# Patient Record
Sex: FEMALE | Race: WHITE | NOT HISPANIC OR LATINO | Employment: FULL TIME | URBAN - METROPOLITAN AREA
[De-identification: names, ages, dates, MRNs, and addresses within clinical notes are randomized per-mention and may not be internally consistent; named-entity substitution may affect disease eponyms.]

---

## 2017-06-23 ENCOUNTER — APPOINTMENT (OUTPATIENT)
Dept: WOMENS IMAGING | Facility: HOSPITAL | Age: 62
End: 2017-06-23

## 2017-06-23 PROCEDURE — 77067 SCR MAMMO BI INCL CAD: CPT | Performed by: RADIOLOGY

## 2017-06-23 PROCEDURE — 77063 BREAST TOMOSYNTHESIS BI: CPT | Performed by: RADIOLOGY

## 2017-06-23 PROCEDURE — G0202 SCR MAMMO BI INCL CAD: HCPCS | Performed by: RADIOLOGY

## 2017-08-14 DIAGNOSIS — Z00.00 HEALTH CARE MAINTENANCE: Primary | ICD-10-CM

## 2017-08-14 DIAGNOSIS — E03.9 ACQUIRED HYPOTHYROIDISM: ICD-10-CM

## 2017-08-16 LAB
25(OH)D3+25(OH)D2 SERPL-MCNC: 39.1 NG/ML (ref 30–100)
ALBUMIN SERPL-MCNC: 4.6 G/DL (ref 3.5–5.2)
ALBUMIN/GLOB SERPL: 1.7 G/DL
ALP SERPL-CCNC: 91 U/L (ref 39–117)
ALT SERPL-CCNC: 25 U/L (ref 1–33)
APPEARANCE UR: CLEAR
AST SERPL-CCNC: 27 U/L (ref 1–32)
BACTERIA #/AREA URNS HPF: NORMAL /HPF
BASOPHILS # BLD AUTO: 0.05 10*3/MM3 (ref 0–0.2)
BASOPHILS NFR BLD AUTO: 0.9 % (ref 0–1.5)
BILIRUB SERPL-MCNC: 0.5 MG/DL (ref 0.1–1.2)
BILIRUB UR QL STRIP: NEGATIVE
BUN SERPL-MCNC: 15 MG/DL (ref 8–23)
BUN/CREAT SERPL: 18.8 (ref 7–25)
CALCIUM SERPL-MCNC: 9.8 MG/DL (ref 8.6–10.5)
CHLORIDE SERPL-SCNC: 96 MMOL/L (ref 98–107)
CHOLEST SERPL-MCNC: 245 MG/DL (ref 0–200)
CO2 SERPL-SCNC: 26.1 MMOL/L (ref 22–29)
COLOR UR: YELLOW
CREAT SERPL-MCNC: 0.8 MG/DL (ref 0.57–1)
EOSINOPHIL # BLD AUTO: 0.61 10*3/MM3 (ref 0–0.7)
EOSINOPHIL NFR BLD AUTO: 11 % (ref 0.3–6.2)
EPI CELLS #/AREA URNS HPF: NORMAL /HPF
ERYTHROCYTE [DISTWIDTH] IN BLOOD BY AUTOMATED COUNT: 13.5 % (ref 11.7–13)
GLOBULIN SER CALC-MCNC: 2.7 GM/DL
GLUCOSE SERPL-MCNC: 95 MG/DL (ref 65–99)
GLUCOSE UR QL: NEGATIVE
HBA1C MFR BLD: 5.58 % (ref 4.8–5.6)
HCT VFR BLD AUTO: 43.4 % (ref 35.6–45.5)
HDLC SERPL-MCNC: 109 MG/DL (ref 40–60)
HGB BLD-MCNC: 13.8 G/DL (ref 11.9–15.5)
HGB UR QL STRIP: NEGATIVE
IMM GRANULOCYTES # BLD: 0 10*3/MM3 (ref 0–0.03)
IMM GRANULOCYTES NFR BLD: 0 % (ref 0–0.5)
KETONES UR QL STRIP: NEGATIVE
LDLC SERPL CALC-MCNC: 122 MG/DL (ref 0–100)
LEUKOCYTE ESTERASE UR QL STRIP: NEGATIVE
LYMPHOCYTES # BLD AUTO: 1.69 10*3/MM3 (ref 0.9–4.8)
LYMPHOCYTES NFR BLD AUTO: 30.6 % (ref 19.6–45.3)
MCH RBC QN AUTO: 31.3 PG (ref 26.9–32)
MCHC RBC AUTO-ENTMCNC: 31.8 G/DL (ref 32.4–36.3)
MCV RBC AUTO: 98.4 FL (ref 80.5–98.2)
MICRO URNS: ABNORMAL
MICRO URNS: ABNORMAL
MONOCYTES # BLD AUTO: 0.42 10*3/MM3 (ref 0.2–1.2)
MONOCYTES NFR BLD AUTO: 7.6 % (ref 5–12)
NEUTROPHILS # BLD AUTO: 2.76 10*3/MM3 (ref 1.9–8.1)
NEUTROPHILS NFR BLD AUTO: 49.9 % (ref 42.7–76)
NITRITE UR QL STRIP: NEGATIVE
PH UR STRIP: 7 [PH] (ref 5–7.5)
PLATELET # BLD AUTO: 241 10*3/MM3 (ref 140–500)
POTASSIUM SERPL-SCNC: 3.8 MMOL/L (ref 3.5–5.2)
PROT SERPL-MCNC: 7.3 G/DL (ref 6–8.5)
PROT UR QL STRIP: NEGATIVE
RBC # BLD AUTO: 4.41 10*6/MM3 (ref 3.9–5.2)
RBC #/AREA URNS HPF: NORMAL /HPF
SODIUM SERPL-SCNC: 136 MMOL/L (ref 136–145)
SP GR UR: <=1.005 (ref 1–1.03)
TRIGL SERPL-MCNC: 69 MG/DL (ref 0–150)
TSH SERPL DL<=0.005 MIU/L-ACNC: 1.72 MIU/ML (ref 0.27–4.2)
URINALYSIS REFLEX: ABNORMAL
UROBILINOGEN UR STRIP-MCNC: 0.2 MG/DL (ref 0.2–1)
VLDLC SERPL CALC-MCNC: 13.8 MG/DL (ref 5–40)
WBC # BLD AUTO: 5.53 10*3/MM3 (ref 4.5–10.7)
WBC #/AREA URNS HPF: NORMAL /HPF

## 2017-08-22 ENCOUNTER — OFFICE VISIT (OUTPATIENT)
Dept: INTERNAL MEDICINE | Facility: CLINIC | Age: 62
End: 2017-08-22

## 2017-08-22 VITALS
HEIGHT: 66 IN | RESPIRATION RATE: 18 BRPM | WEIGHT: 109 LBS | SYSTOLIC BLOOD PRESSURE: 132 MMHG | OXYGEN SATURATION: 97 % | BODY MASS INDEX: 17.52 KG/M2 | HEART RATE: 77 BPM | DIASTOLIC BLOOD PRESSURE: 68 MMHG

## 2017-08-22 DIAGNOSIS — Z00.00 HEALTH MAINTENANCE EXAMINATION: Primary | ICD-10-CM

## 2017-08-22 DIAGNOSIS — E03.9 ACQUIRED HYPOTHYROIDISM: ICD-10-CM

## 2017-08-22 PROCEDURE — 99396 PREV VISIT EST AGE 40-64: CPT | Performed by: INTERNAL MEDICINE

## 2017-08-22 RX ORDER — CLOBETASOL PROPIONATE 0.5 MG/G
OINTMENT TOPICAL
Refills: 3 | COMMUNITY
Start: 2017-06-14 | End: 2021-10-05

## 2017-08-22 NOTE — PROGRESS NOTES
Chief Complaint  Joanne Martin is a 62 y.o. female who presents for Annual Exam (CPE)  .    History of Present Illness   Joanne is here for routine CPE.  No new concerns.    Mammo:6/2017  Pap: 2013 Dr. Reza  C-scope:10/27/15 due 2020    Exercise: not much at all.     Immunization History   Administered Date(s) Administered   • Tdap 05/01/2010   • Zoster 11/13/2015       Review of Systems   Constitution: Negative for chills, fever and malaise/fatigue.   HENT: Positive for hearing loss (just starting). Negative for headaches, hoarse voice and sore throat.    Eyes: Negative for blurred vision, double vision and visual disturbance.   Cardiovascular: Negative for chest pain, leg swelling and palpitations.   Respiratory: Negative for cough and shortness of breath.    Endocrine: Negative for polydipsia and polyuria.   Hematologic/Lymphatic: Does not bruise/bleed easily.   Skin: Negative for rash and suspicious lesions.   Musculoskeletal: Negative for arthritis and myalgias.   Gastrointestinal: Negative for abdominal pain, change in bowel habit, constipation, diarrhea, dysphagia, hematochezia, melena, nausea and vomiting.   Genitourinary: Negative for dysuria and hematuria.   Neurological: Negative for dizziness and light-headedness.   Psychiatric/Behavioral: Negative for depression. The patient is not nervous/anxious.        Patient Active Problem List   Diagnosis   • Hypothyroidism       Past Medical History:   Diagnosis Date   • Carcinoma of tongue    • History of colonic polyps    • History of eczema    • History of fracture of clavicle    • Hypothyroidism        Past Surgical History:   Procedure Laterality Date   • BREAST SURGERY     • MOUTH SURGERY     • NASAL SEPTUM SURGERY      deviation repair       Family History   Problem Relation Age of Onset   • Colon cancer Father    • Macular degeneration Maternal Grandmother    • Thyroid disease Paternal Grandmother    • Coronary artery disease Other    • Melanoma Other   "      Social History     Social History   • Marital status: Unknown     Spouse name: N/A   • Number of children: N/A   • Years of education: N/A     Occupational History   •       Social History Main Topics   • Smoking status: Never Smoker   • Smokeless tobacco: Never Used   • Alcohol use No   • Drug use: No   • Sexual activity: Not on file     Other Topics Concern   • Not on file     Social History Narrative       Current Outpatient Prescriptions on File Prior to Visit   Medication Sig Dispense Refill   • SF 1.1 % gel U UTD.  0   • SYNTHROID 75 MCG tablet TAKE 1 TABLET BY MOUTH EVERY MORNING 90 tablet 3     No current facility-administered medications on file prior to visit.        Allergies   Allergen Reactions   • Cephalexin    • Hydrocortisone    • Latex    • Neomycin-Bacitracin Zn-Polymyx        Vitals:    08/22/17 1501   BP: 132/68   Pulse: 77   Resp: 18   SpO2: 97%   Weight: 109 lb (49.4 kg)   Height: 66\" (167.6 cm)       Body mass index is 17.59 kg/(m^2).    Objective   Physical Exam   Constitutional: She is oriented to person, place, and time. She appears well-developed and well-nourished. No distress.   HENT:   Head: Normocephalic and atraumatic.   Nose: Nose normal.   Mouth/Throat: Oropharynx is clear and moist.   Eyes: Conjunctivae and EOM are normal. Pupils are equal, round, and reactive to light. No scleral icterus.   Neck: Normal range of motion. Neck supple. No thyromegaly present.   Cardiovascular: Normal rate, regular rhythm and normal heart sounds.  Exam reveals no gallop and no friction rub.    No murmur heard.  Pulses:       Carotid pulses are 2+ on the right side, and 2+ on the left side.       Femoral pulses are 2+ on the right side, and 2+ on the left side.       Dorsalis pedis pulses are 2+ on the right side, and 2+ on the left side.        Posterior tibial pulses are 2+ on the right side, and 2+ on the left side.   Pulmonary/Chest: Effort normal and breath sounds normal. " No respiratory distress. She has no wheezes. She has no rales. Right breast exhibits no mass and no nipple discharge. Left breast exhibits no mass and no nipple discharge.   Abdominal: Soft. Bowel sounds are normal. She exhibits no distension and no mass. There is no tenderness.   Musculoskeletal: Normal range of motion. She exhibits no edema.       Vascular Status -  Her exam exhibits no right foot edema. Her exam exhibits no left foot edema.   Skin Integrity  -  Her right foot skin is intact.     Joanne 's left foot skin is intact. .  Lymphadenopathy:     She has no cervical adenopathy.     She has no axillary adenopathy.        Right: No inguinal and no supraclavicular adenopathy present.        Left: No inguinal and no supraclavicular adenopathy present.   Neurological: She is alert and oriented to person, place, and time. She has normal reflexes. No cranial nerve deficit.   Skin: Skin is warm and dry.   Psychiatric: She has a normal mood and affect. Her speech is normal and behavior is normal. Judgment and thought content normal. Cognition and memory are normal.   Vitals reviewed.        Results for orders placed or performed in visit on 08/14/17   Comprehensive Metabolic Panel   Result Value Ref Range    Glucose 95 65 - 99 mg/dL    BUN 15 8 - 23 mg/dL    Creatinine 0.80 0.57 - 1.00 mg/dL    eGFR Non African Am 73 >60 mL/min/1.73    eGFR African Am 89 >60 mL/min/1.73    BUN/Creatinine Ratio 18.8 7.0 - 25.0    Sodium 136 136 - 145 mmol/L    Potassium 3.8 3.5 - 5.2 mmol/L    Chloride 96 (L) 98 - 107 mmol/L    Total CO2 26.1 22.0 - 29.0 mmol/L    Calcium 9.8 8.6 - 10.5 mg/dL    Total Protein 7.3 6.0 - 8.5 g/dL    Albumin 4.60 3.50 - 5.20 g/dL    Globulin 2.7 gm/dL    A/G Ratio 1.7 g/dL    Total Bilirubin 0.5 0.1 - 1.2 mg/dL    Alkaline Phosphatase 91 39 - 117 U/L    AST (SGOT) 27 1 - 32 U/L    ALT (SGPT) 25 1 - 33 U/L   Lipid Panel   Result Value Ref Range    Total Cholesterol 245 (H) 0 - 200 mg/dL    Triglycerides  69 0 - 150 mg/dL    HDL Cholesterol 109 (H) 40 - 60 mg/dL    VLDL Cholesterol 13.8 5 - 40 mg/dL    LDL Cholesterol  122 (H) 0 - 100 mg/dL   TSH Rfx On Abnormal To Free T4   Result Value Ref Range    TSH 1.72 0.27 - 4.2 mIU/mL   UA / M With / Rflx Culture([facility] ONLY)   Result Value Ref Range    Specific Gravity, UA      <=1.005 (A) 1.005 - 1.030    pH, UA 7.0 5.0 - 7.5    Color, UA Yellow Yellow    Appearance, UA Clear Clear    Leukocytes, UA Negative Negative    Protein Negative Negative/Trace    Glucose, UA Negative Negative    Ketones Negative Negative    Blood, UA Negative Negative    Bilirubin, UA Negative Negative    Urobilinogen, UA 0.2 0.2 - 1.0 mg/dL    Nitrite, UA Negative Negative    Microscopic Examination Comment     MICROSCOPIC EXAMINATION See below:     Urinalysis Reflex Comment    Vitamin D 25 Hydroxy   Result Value Ref Range    25 Hydroxy, Vitamin D 39.1 30.0 - 100.0 ng/mL   Hemoglobin A1c   Result Value Ref Range    Hemoglobin A1C 5.58 4.80 - 5.60 %   Microscopic Examination   Result Value Ref Range    WBC, UA 0-5 0 - 5 /hpf    RBC, UA None seen 0 - 2 /hpf    Epithelial Cells (non renal) 0-10 0 - 10 /hpf    Bacteria, UA None seen None seen/Few /hpf   CBC & Differential   Result Value Ref Range    WBC 5.53 4.50 - 10.70 10*3/mm3    RBC 4.41 3.90 - 5.20 10*6/mm3    Hemoglobin 13.8 11.9 - 15.5 g/dL    Hematocrit 43.4 35.6 - 45.5 %    MCV 98.4 (H) 80.5 - 98.2 fL    MCH 31.3 26.9 - 32.0 pg    MCHC 31.8 (L) 32.4 - 36.3 g/dL    RDW 13.5 (H) 11.7 - 13.0 %    Platelets 241 140 - 500 10*3/mm3    Neutrophil Rel % 49.9 42.7 - 76.0 %    Lymphocyte Rel % 30.6 19.6 - 45.3 %    Monocyte Rel % 7.6 5.0 - 12.0 %    Eosinophil Rel % 11.0 (H) 0.3 - 6.2 %    Basophil Rel % 0.9 0.0 - 1.5 %    Neutrophils Absolute 2.76 1.90 - 8.10 10*3/mm3    Lymphocytes Absolute 1.69 0.90 - 4.80 10*3/mm3    Monocytes Absolute 0.42 0.20 - 1.20 10*3/mm3    Eosinophils Absolute 0.61 0.00 - 0.70 10*3/mm3    Basophils Absolute 0.05 0.00 -  0.20 10*3/mm3    Immature Granulocyte Rel % 0.0 0.0 - 0.5 %    Immature Grans Absolute 0.00 0.00 - 0.03 10*3/mm3       Assessment/Plan   Diagnoses and all orders for this visit:    Health maintenance examination    Acquired hypothyroidism    Other orders  -     clobetasol (TEMOVATE) 0.05 % ointment;   -     mupirocin (BACTROBAN) 2 % ointment; Apply  topically 3 (Three) Times a Day.        Discussion/Summary  Joanne is here for routine CPE.  She is up to date on all health maintenance except for paps.  She will get in with her gyn.  She has not been exercising nearly as much because her mother is now living with her.  She is really eager to get back to a more normal exercise routine.        Return in about 1 year (around 8/22/2018) for Annual physical, with fasting labs prior.

## 2017-12-11 RX ORDER — LEVOTHYROXINE SODIUM 75 MCG
TABLET ORAL
Qty: 90 TABLET | Refills: 3 | Status: SHIPPED | OUTPATIENT
Start: 2017-12-11 | End: 2018-10-22

## 2018-06-25 ENCOUNTER — APPOINTMENT (OUTPATIENT)
Dept: WOMENS IMAGING | Facility: HOSPITAL | Age: 63
End: 2018-06-25

## 2018-06-25 PROCEDURE — 77063 BREAST TOMOSYNTHESIS BI: CPT | Performed by: RADIOLOGY

## 2018-06-25 PROCEDURE — 77067 SCR MAMMO BI INCL CAD: CPT | Performed by: RADIOLOGY

## 2018-08-24 DIAGNOSIS — E03.9 ACQUIRED HYPOTHYROIDISM: ICD-10-CM

## 2018-08-24 DIAGNOSIS — Z00.00 HEALTH CARE MAINTENANCE: Primary | ICD-10-CM

## 2018-08-28 LAB
25(OH)D3+25(OH)D2 SERPL-MCNC: 42.2 NG/ML (ref 30–100)
ALBUMIN SERPL-MCNC: 4.2 G/DL (ref 3.5–5.2)
ALBUMIN/GLOB SERPL: 1.6 G/DL
ALP SERPL-CCNC: 75 U/L (ref 39–117)
ALT SERPL-CCNC: 28 U/L (ref 1–33)
APPEARANCE UR: CLEAR
AST SERPL-CCNC: 29 U/L (ref 1–32)
BACTERIA #/AREA URNS HPF: NORMAL /HPF
BASOPHILS # BLD AUTO: 0.04 10*3/MM3 (ref 0–0.2)
BASOPHILS NFR BLD AUTO: 0.7 % (ref 0–1.5)
BILIRUB SERPL-MCNC: 0.3 MG/DL (ref 0.1–1.2)
BILIRUB UR QL STRIP: NEGATIVE
BUN SERPL-MCNC: 19 MG/DL (ref 8–23)
BUN/CREAT SERPL: 23.2 (ref 7–25)
CALCIUM SERPL-MCNC: 9.6 MG/DL (ref 8.6–10.5)
CHLORIDE SERPL-SCNC: 100 MMOL/L (ref 98–107)
CHOLEST SERPL-MCNC: 232 MG/DL (ref 0–200)
CO2 SERPL-SCNC: 25.6 MMOL/L (ref 22–29)
COLOR UR: YELLOW
CREAT SERPL-MCNC: 0.82 MG/DL (ref 0.57–1)
EOSINOPHIL # BLD AUTO: 0.71 10*3/MM3 (ref 0–0.7)
EOSINOPHIL NFR BLD AUTO: 12.4 % (ref 0.3–6.2)
EPI CELLS #/AREA URNS HPF: NORMAL /HPF
ERYTHROCYTE [DISTWIDTH] IN BLOOD BY AUTOMATED COUNT: 13.6 % (ref 11.7–13)
GLOBULIN SER CALC-MCNC: 2.7 GM/DL
GLUCOSE SERPL-MCNC: 92 MG/DL (ref 65–99)
GLUCOSE UR QL: NEGATIVE
HBA1C MFR BLD: 5.5 % (ref 4.8–5.6)
HCT VFR BLD AUTO: 42.7 % (ref 35.6–45.5)
HDLC SERPL-MCNC: 105 MG/DL (ref 40–60)
HGB BLD-MCNC: 13.5 G/DL (ref 11.9–15.5)
HGB UR QL STRIP: ABNORMAL
IMM GRANULOCYTES # BLD: 0 10*3/MM3 (ref 0–0.03)
IMM GRANULOCYTES NFR BLD: 0 % (ref 0–0.5)
KETONES UR QL STRIP: NEGATIVE
LDLC SERPL CALC-MCNC: 115 MG/DL (ref 0–100)
LEUKOCYTE ESTERASE UR QL STRIP: NEGATIVE
LYMPHOCYTES # BLD AUTO: 1.81 10*3/MM3 (ref 0.9–4.8)
LYMPHOCYTES NFR BLD AUTO: 31.5 % (ref 19.6–45.3)
MCH RBC QN AUTO: 30.7 PG (ref 26.9–32)
MCHC RBC AUTO-ENTMCNC: 31.6 G/DL (ref 32.4–36.3)
MCV RBC AUTO: 97 FL (ref 80.5–98.2)
MICRO URNS: ABNORMAL
MONOCYTES # BLD AUTO: 0.53 10*3/MM3 (ref 0.2–1.2)
MONOCYTES NFR BLD AUTO: 9.2 % (ref 5–12)
MUCOUS THREADS URNS QL MICRO: PRESENT /HPF
NEUTROPHILS # BLD AUTO: 2.65 10*3/MM3 (ref 1.9–8.1)
NEUTROPHILS NFR BLD AUTO: 46.2 % (ref 42.7–76)
NITRITE UR QL STRIP: NEGATIVE
PH UR STRIP: 6.5 [PH] (ref 5–7.5)
PLATELET # BLD AUTO: 227 10*3/MM3 (ref 140–500)
POTASSIUM SERPL-SCNC: 4.2 MMOL/L (ref 3.5–5.2)
PROT SERPL-MCNC: 6.9 G/DL (ref 6–8.5)
PROT UR QL STRIP: NEGATIVE
RBC # BLD AUTO: 4.4 10*6/MM3 (ref 3.9–5.2)
RBC #/AREA URNS HPF: NORMAL /HPF
SODIUM SERPL-SCNC: 139 MMOL/L (ref 136–145)
SP GR UR: 1.02 (ref 1–1.03)
T4 FREE SERPL-MCNC: 1.18 NG/DL (ref 0.93–1.7)
TRIGL SERPL-MCNC: 62 MG/DL (ref 0–150)
TSH SERPL DL<=0.005 MIU/L-ACNC: 5.53 MIU/ML (ref 0.27–4.2)
URINALYSIS REFLEX: ABNORMAL
UROBILINOGEN UR STRIP-MCNC: 0.2 MG/DL (ref 0.2–1)
VLDLC SERPL CALC-MCNC: 12.4 MG/DL (ref 5–40)
WBC # BLD AUTO: 5.74 10*3/MM3 (ref 4.5–10.7)
WBC #/AREA URNS HPF: NORMAL /HPF

## 2018-09-04 ENCOUNTER — OFFICE VISIT (OUTPATIENT)
Dept: INTERNAL MEDICINE | Facility: CLINIC | Age: 63
End: 2018-09-04

## 2018-09-04 VITALS
OXYGEN SATURATION: 99 % | DIASTOLIC BLOOD PRESSURE: 68 MMHG | WEIGHT: 112 LBS | BODY MASS INDEX: 18 KG/M2 | RESPIRATION RATE: 18 BRPM | SYSTOLIC BLOOD PRESSURE: 118 MMHG | HEIGHT: 66 IN | HEART RATE: 72 BPM

## 2018-09-04 DIAGNOSIS — Z00.00 HEALTH MAINTENANCE EXAMINATION: Primary | ICD-10-CM

## 2018-09-04 DIAGNOSIS — M81.0 OSTEOPOROSIS OF LUMBAR SPINE: ICD-10-CM

## 2018-09-04 DIAGNOSIS — E03.9 ACQUIRED HYPOTHYROIDISM: ICD-10-CM

## 2018-09-04 DIAGNOSIS — M85.859 OSTEOPENIA OF HIP: ICD-10-CM

## 2018-09-04 DIAGNOSIS — Z78.0 POSTMENOPAUSAL: ICD-10-CM

## 2018-09-04 PROBLEM — M85.88 OSTEOPENIA OF LUMBAR SPINE: Status: ACTIVE | Noted: 2018-09-04

## 2018-09-04 PROBLEM — L30.9 ECZEMA: Status: ACTIVE | Noted: 2018-09-04

## 2018-09-04 PROCEDURE — 90471 IMMUNIZATION ADMIN: CPT | Performed by: INTERNAL MEDICINE

## 2018-09-04 PROCEDURE — 77080 DXA BONE DENSITY AXIAL: CPT | Performed by: INTERNAL MEDICINE

## 2018-09-04 PROCEDURE — 90632 HEPA VACCINE ADULT IM: CPT | Performed by: INTERNAL MEDICINE

## 2018-09-04 PROCEDURE — 99396 PREV VISIT EST AGE 40-64: CPT | Performed by: INTERNAL MEDICINE

## 2018-09-04 RX ORDER — CHLORPHENIRAMINE MALEATE 4 MG/1
TABLET ORAL
COMMUNITY
End: 2018-09-04

## 2018-09-04 NOTE — PROGRESS NOTES
Chief Complaint  Joanne Martin is a 63 y.o. female who presents for Annual Exam (CPE)  .    History of Present Illness   Joanne is here for routine CPE.  No new concerns.    Mammo: 6/25/2018  Pap: 5/30/2018,   DEXA: 8/18/2016, due this year.   C-scope: 10/27/2015, repeat in five years.     Exercise: Walks a lot, not much exercise other than that.     Immunization History   Administered Date(s) Administered   • Hepatitis A 09/04/2018   • Tdap 05/01/2010   • Zostavax 11/13/2015       Review of Systems   Constitution: Negative for chills, fever and malaise/fatigue.   HENT: Negative for hearing loss, hoarse voice and sore throat.    Eyes: Negative for blurred vision, double vision and visual disturbance.   Cardiovascular: Negative for chest pain, leg swelling and palpitations.   Respiratory: Negative for cough and shortness of breath.    Endocrine: Negative for polydipsia and polyuria.   Hematologic/Lymphatic: Does not bruise/bleed easily.   Skin: Positive for rash (eczema). Negative for suspicious lesions.   Musculoskeletal: Negative for arthritis and myalgias.   Gastrointestinal: Negative for bloating, abdominal pain, change in bowel habit, constipation, diarrhea, dysphagia, hematochezia, melena, nausea and vomiting.   Genitourinary: Negative for dysuria and hematuria.   Neurological: Negative for dizziness, headaches and light-headedness.   Psychiatric/Behavioral: Negative for depression. The patient is not nervous/anxious.        Patient Active Problem List   Diagnosis   • Hypothyroidism   • Eczema   • Osteopenia of lumbar spine   • Osteopenia of hip       Past Medical History:   Diagnosis Date   • Carcinoma of tongue (CMS/HCC)    • History of colonic polyps    • History of eczema    • History of fracture of clavicle    • Hypothyroidism        Past Surgical History:   Procedure Laterality Date   • BREAST SURGERY     • MOUTH SURGERY     • NASAL SEPTUM SURGERY      deviation repair       Family History  "  Problem Relation Age of Onset   • Colon cancer Father    • Macular degeneration Maternal Grandmother    • Thyroid disease Paternal Grandmother    • Coronary artery disease Other    • Melanoma Other        Social History     Social History   • Marital status: Unknown     Spouse name: N/A   • Number of children: N/A   • Years of education: N/A     Occupational History   •       Social History Main Topics   • Smoking status: Never Smoker   • Smokeless tobacco: Never Used   • Alcohol use No   • Drug use: No   • Sexual activity: Not on file     Other Topics Concern   • Not on file     Social History Narrative   • No narrative on file       Current Outpatient Prescriptions on File Prior to Visit   Medication Sig Dispense Refill   • clobetasol (TEMOVATE) 0.05 % ointment   3   • mupirocin (BACTROBAN) 2 % ointment Apply  topically 3 (Three) Times a Day. 22 g 2   • SF 1.1 % gel U UTD.  0   • SYNTHROID 75 MCG tablet TAKE 1 TABLET BY MOUTH EVERY MORNING 90 tablet 3     No current facility-administered medications on file prior to visit.        Allergies   Allergen Reactions   • Cephalexin    • Hydrocortisone    • Latex    • Neomycin-Bacitracin Zn-Polymyx        Vitals:    09/04/18 0814   BP: 118/68   Pulse: 72   Resp: 18   SpO2: 99%   Weight: 50.8 kg (112 lb)   Height: 167.6 cm (65.98\")       Body mass index is 18.09 kg/m².    Objective   Physical Exam   Constitutional: She is oriented to person, place, and time. She appears well-developed and well-nourished. No distress.   HENT:   Head: Normocephalic and atraumatic.   Nose: Nose normal.   Mouth/Throat: Oropharynx is clear and moist.   Eyes: Pupils are equal, round, and reactive to light. Conjunctivae and EOM are normal. No scleral icterus.   Neck: Normal range of motion. Neck supple. No thyromegaly present.   Cardiovascular: Normal rate, regular rhythm and normal heart sounds.  Exam reveals no gallop and no friction rub.    No murmur heard.  Pulses:       " Carotid pulses are 2+ on the right side, and 2+ on the left side.       Femoral pulses are 2+ on the right side, and 2+ on the left side.       Dorsalis pedis pulses are 2+ on the right side, and 2+ on the left side.        Posterior tibial pulses are 2+ on the right side, and 2+ on the left side.   Pulmonary/Chest: Effort normal and breath sounds normal. No respiratory distress. She has no wheezes. She has no rales. Right breast exhibits no mass and no nipple discharge. Left breast exhibits no mass and no nipple discharge.   Abdominal: Soft. Bowel sounds are normal. She exhibits no distension and no mass. There is no tenderness.   Musculoskeletal: Normal range of motion. She exhibits no edema.     Vascular Status -  Her right foot exhibits no edema. Her left foot exhibits no edema.  Skin Integrity  -  Her right foot skin is intact.Her left foot skin is intact..  Lymphadenopathy:     She has no cervical adenopathy.     She has no axillary adenopathy.        Right: No inguinal and no supraclavicular adenopathy present.        Left: No inguinal and no supraclavicular adenopathy present.   Neurological: She is alert and oriented to person, place, and time. She has normal reflexes. No cranial nerve deficit.   Skin: Skin is warm and dry.   Psychiatric: She has a normal mood and affect. Her speech is normal and behavior is normal. Judgment and thought content normal. Cognition and memory are normal.   Vitals reviewed.        Results for orders placed or performed in visit on 08/24/18   Comprehensive Metabolic Panel   Result Value Ref Range    Glucose 92 65 - 99 mg/dL    BUN 19 8 - 23 mg/dL    Creatinine 0.82 0.57 - 1.00 mg/dL    eGFR Non African Am 71 >60 mL/min/1.73    eGFR African Am 86 >60 mL/min/1.73    BUN/Creatinine Ratio 23.2 7.0 - 25.0    Sodium 139 136 - 145 mmol/L    Potassium 4.2 3.5 - 5.2 mmol/L    Chloride 100 98 - 107 mmol/L    Total CO2 25.6 22.0 - 29.0 mmol/L    Calcium 9.6 8.6 - 10.5 mg/dL    Total  Protein 6.9 6.0 - 8.5 g/dL    Albumin 4.20 3.50 - 5.20 g/dL    Globulin 2.7 gm/dL    A/G Ratio 1.6 g/dL    Total Bilirubin 0.3 0.1 - 1.2 mg/dL    Alkaline Phosphatase 75 39 - 117 U/L    AST (SGOT) 29 1 - 32 U/L    ALT (SGPT) 28 1 - 33 U/L   Lipid Panel   Result Value Ref Range    Total Cholesterol 232 (H) 0 - 200 mg/dL    Triglycerides 62 0 - 150 mg/dL    HDL Cholesterol 105 (H) 40 - 60 mg/dL    VLDL Cholesterol 12.4 5 - 40 mg/dL    LDL Cholesterol  115 (H) 0 - 100 mg/dL   TSH Rfx On Abnormal To Free T4   Result Value Ref Range    TSH 5.53 (H) 0.27 - 4.2 mIU/mL   Vitamin D 25 Hydroxy   Result Value Ref Range    25 Hydroxy, Vitamin D 42.2 30.0 - 100.0 ng/mL   Hemoglobin A1c   Result Value Ref Range    Hemoglobin A1C 5.50 4.80 - 5.60 %   Urinalysis With Culture If Indicated - Urine, Clean Catch   Result Value Ref Range    Specific Gravity, UA 1.017 1.005 - 1.030    pH, UA 6.5 5.0 - 7.5    Color, UA Yellow Yellow    Appearance, UA Clear Clear    Leukocytes, UA Negative Negative    Protein Negative Negative/Trace    Glucose, UA Negative Negative    Ketones Negative Negative    Blood, UA Trace (A) Negative    Bilirubin, UA Negative Negative    Urobilinogen, UA 0.2 0.2 - 1.0 mg/dL    Nitrite, UA Negative Negative    Microscopic Examination See below:     Urinalysis Reflex Comment    Microscopic Examination   Result Value Ref Range    WBC, UA 0-5 0 - 5 /hpf    RBC, UA 0-2 0 - 2 /hpf    Epithelial Cells (non renal) 0-10 0 - 10 /hpf    Mucus, UA Present Not Estab. /hpf    Bacteria, UA None seen None seen/Few /hpf   T4F   Result Value Ref Range    Free T4 1.18 0.93 - 1.70 ng/dL   CBC & Differential   Result Value Ref Range    WBC 5.74 4.50 - 10.70 10*3/mm3    RBC 4.40 3.90 - 5.20 10*6/mm3    Hemoglobin 13.5 11.9 - 15.5 g/dL    Hematocrit 42.7 35.6 - 45.5 %    MCV 97.0 80.5 - 98.2 fL    MCH 30.7 26.9 - 32.0 pg    MCHC 31.6 (L) 32.4 - 36.3 g/dL    RDW 13.6 (H) 11.7 - 13.0 %    Platelets 227 140 - 500 10*3/mm3    Neutrophil Rel  % 46.2 42.7 - 76.0 %    Lymphocyte Rel % 31.5 19.6 - 45.3 %    Monocyte Rel % 9.2 5.0 - 12.0 %    Eosinophil Rel % 12.4 (H) 0.3 - 6.2 %    Basophil Rel % 0.7 0.0 - 1.5 %    Neutrophils Absolute 2.65 1.90 - 8.10 10*3/mm3    Lymphocytes Absolute 1.81 0.90 - 4.80 10*3/mm3    Monocytes Absolute 0.53 0.20 - 1.20 10*3/mm3    Eosinophils Absolute 0.71 (H) 0.00 - 0.70 10*3/mm3    Basophils Absolute 0.04 0.00 - 0.20 10*3/mm3    Immature Granulocyte Rel % 0.0 0.0 - 0.5 %    Immature Grans Absolute 0.00 0.00 - 0.03 10*3/mm3       Assessment/Plan   Diagnoses and all orders for this visit:    Health maintenance examination  -     Hepatitis A Vaccine Adult IM    Acquired hypothyroidism  -     TSH; Future    Osteoporosis of lumbar spine    Osteopenia of hip    Postmenopausal  -     DEXA Bone Density Axial    Other orders  -     Discontinue: chlorpheniramine (CHLOR-TRIMETON) 4 MG tablet; Take  by mouth.        Discussion/Summary  Joanne is here for routine CPE.  She is up to date on almost all health maintenance.  She will look into the new shingles vaccine at her local pharmacy.  Her thyroid is off slightly.  She does miss doses occasionally.  We are going to recheck her TSH in 6 weeks.  Her bone density has worsened slightly.  Given her hx of XRT for her tongue cancer, she is not a candidate for bisphosphonate therapy.  She is going to make sure she is getting 1200 mg of calcium daily. Weight bearing exercise.  Repeat a DEXA in 2 years.        Return in about 6 weeks (around 10/16/2018) for labs only nonfasting; one year CPE with labs. .

## 2018-10-16 ENCOUNTER — RESULTS ENCOUNTER (OUTPATIENT)
Dept: INTERNAL MEDICINE | Facility: CLINIC | Age: 63
End: 2018-10-16

## 2018-10-16 DIAGNOSIS — E03.9 ACQUIRED HYPOTHYROIDISM: ICD-10-CM

## 2018-10-18 ENCOUNTER — TELEPHONE (OUTPATIENT)
Dept: INTERNAL MEDICINE | Facility: CLINIC | Age: 63
End: 2018-10-18

## 2018-10-18 DIAGNOSIS — E03.9 ACQUIRED HYPOTHYROIDISM: Primary | ICD-10-CM

## 2018-10-18 LAB — TSH SERPL DL<=0.005 MIU/L-ACNC: 4.34 MIU/ML (ref 0.27–4.2)

## 2018-10-18 NOTE — TELEPHONE ENCOUNTER
----- Message from Tracie Robbins MD sent at 10/18/2018  4:30 PM EDT -----  Please call - her thyroid is still off slightly.  I think we should increase her levothyroxine from 75 to 88 and recheck in 6 weeks.

## 2018-10-19 RX ORDER — LEVOTHYROXINE SODIUM 88 UG/1
88 TABLET ORAL DAILY
Qty: 30 TABLET | Refills: 3 | Status: SHIPPED | OUTPATIENT
Start: 2018-10-19 | End: 2019-02-12 | Stop reason: SDUPTHER

## 2018-10-19 NOTE — TELEPHONE ENCOUNTER
I sent the 88mcg in today so the patient could start taking the increased dosage.   Pt aware to schedule a lab apt 6 weeks from starting the new dose so we can recheck tsh.

## 2018-11-27 DIAGNOSIS — E03.9 ACQUIRED HYPOTHYROIDISM: ICD-10-CM

## 2018-11-28 LAB — TSH SERPL DL<=0.005 MIU/L-ACNC: 0.47 MIU/ML (ref 0.27–4.2)

## 2018-11-29 ENCOUNTER — TELEPHONE (OUTPATIENT)
Dept: INTERNAL MEDICINE | Facility: CLINIC | Age: 63
End: 2018-11-29

## 2018-11-29 NOTE — TELEPHONE ENCOUNTER
----- Message from Tracie Robbins MD sent at 11/29/2018  8:10 AM EST -----  Please call - her thyroid is well controlled.

## 2019-02-12 RX ORDER — LEVOTHYROXINE SODIUM 88 MCG
TABLET ORAL
Qty: 30 TABLET | Refills: 3 | Status: SHIPPED | OUTPATIENT
Start: 2019-02-12 | End: 2019-05-06

## 2019-05-03 ENCOUNTER — LAB (OUTPATIENT)
Dept: INTERNAL MEDICINE | Facility: CLINIC | Age: 64
End: 2019-05-03

## 2019-05-03 DIAGNOSIS — E03.9 HYPOTHYROIDISM, UNSPECIFIED TYPE: Primary | ICD-10-CM

## 2019-05-03 LAB
T4 FREE SERPL-MCNC: 1.47 NG/DL (ref 0.93–1.7)
TSH SERPL DL<=0.005 MIU/L-ACNC: 0.19 MIU/ML (ref 0.27–4.2)

## 2019-05-03 PROCEDURE — 90471 IMMUNIZATION ADMIN: CPT | Performed by: INTERNAL MEDICINE

## 2019-05-03 PROCEDURE — 90632 HEPA VACCINE ADULT IM: CPT | Performed by: INTERNAL MEDICINE

## 2019-05-06 RX ORDER — LEVOTHYROXINE SODIUM 75 MCG
75 TABLET ORAL DAILY
Qty: 30 TABLET | Refills: 4 | Status: SHIPPED | OUTPATIENT
Start: 2019-05-06 | End: 2019-09-24 | Stop reason: SDUPTHER

## 2019-05-28 DIAGNOSIS — E03.9 ACQUIRED HYPOTHYROIDISM: Primary | ICD-10-CM

## 2019-06-17 DIAGNOSIS — E03.9 HYPOTHYROIDISM, UNSPECIFIED TYPE: Primary | ICD-10-CM

## 2019-06-19 LAB — TSH SERPL DL<=0.005 MIU/L-ACNC: 0.98 MIU/ML (ref 0.27–4.2)

## 2019-06-26 ENCOUNTER — APPOINTMENT (OUTPATIENT)
Dept: WOMENS IMAGING | Facility: HOSPITAL | Age: 64
End: 2019-06-26

## 2019-06-26 PROCEDURE — 77063 BREAST TOMOSYNTHESIS BI: CPT | Performed by: RADIOLOGY

## 2019-06-26 PROCEDURE — 77067 SCR MAMMO BI INCL CAD: CPT | Performed by: RADIOLOGY

## 2019-09-17 DIAGNOSIS — Z00.00 HEALTH CARE MAINTENANCE: Primary | ICD-10-CM

## 2019-09-18 LAB
ALBUMIN SERPL-MCNC: 4.6 G/DL (ref 3.5–5.2)
ALBUMIN/GLOB SERPL: 1.8 G/DL
ALP SERPL-CCNC: 92 U/L (ref 39–117)
ALT SERPL-CCNC: 26 U/L (ref 1–33)
APPEARANCE UR: CLEAR
AST SERPL-CCNC: 27 U/L (ref 1–32)
BACTERIA #/AREA URNS HPF: NORMAL /HPF
BASOPHILS # BLD AUTO: 0.07 10*3/MM3 (ref 0–0.2)
BASOPHILS NFR BLD AUTO: 1.3 % (ref 0–1.5)
BILIRUB SERPL-MCNC: 0.5 MG/DL (ref 0.2–1.2)
BILIRUB UR QL STRIP: NEGATIVE
BUN SERPL-MCNC: 16 MG/DL (ref 8–23)
BUN/CREAT SERPL: 20.3 (ref 7–25)
CALCIUM SERPL-MCNC: 9.6 MG/DL (ref 8.6–10.5)
CHLORIDE SERPL-SCNC: 97 MMOL/L (ref 98–107)
CHOLEST SERPL-MCNC: 255 MG/DL (ref 0–200)
CO2 SERPL-SCNC: 26.2 MMOL/L (ref 22–29)
COLOR UR: YELLOW
CREAT SERPL-MCNC: 0.79 MG/DL (ref 0.57–1)
EOSINOPHIL # BLD AUTO: 0.53 10*3/MM3 (ref 0–0.4)
EOSINOPHIL NFR BLD AUTO: 10 % (ref 0.3–6.2)
EPI CELLS #/AREA URNS HPF: NORMAL /HPF
ERYTHROCYTE [DISTWIDTH] IN BLOOD BY AUTOMATED COUNT: 13.2 % (ref 12.3–15.4)
GLOBULIN SER CALC-MCNC: 2.5 GM/DL
GLUCOSE SERPL-MCNC: 95 MG/DL (ref 65–99)
GLUCOSE UR QL: NEGATIVE
HCT VFR BLD AUTO: 45.6 % (ref 34–46.6)
HDLC SERPL-MCNC: 114 MG/DL (ref 40–60)
HGB BLD-MCNC: 14.8 G/DL (ref 12–15.9)
HGB UR QL STRIP: NEGATIVE
IMM GRANULOCYTES # BLD AUTO: 0.01 10*3/MM3 (ref 0–0.05)
IMM GRANULOCYTES NFR BLD AUTO: 0.2 % (ref 0–0.5)
KETONES UR QL STRIP: NEGATIVE
LDLC SERPL CALC-MCNC: 129 MG/DL (ref 0–100)
LEUKOCYTE ESTERASE UR QL STRIP: NEGATIVE
LYMPHOCYTES # BLD AUTO: 1.37 10*3/MM3 (ref 0.7–3.1)
LYMPHOCYTES NFR BLD AUTO: 25.8 % (ref 19.6–45.3)
MCH RBC QN AUTO: 31 PG (ref 26.6–33)
MCHC RBC AUTO-ENTMCNC: 32.5 G/DL (ref 31.5–35.7)
MCV RBC AUTO: 95.6 FL (ref 79–97)
MICRO URNS: ABNORMAL
MICRO URNS: ABNORMAL
MONOCYTES # BLD AUTO: 0.4 10*3/MM3 (ref 0.1–0.9)
MONOCYTES NFR BLD AUTO: 7.5 % (ref 5–12)
NEUTROPHILS # BLD AUTO: 2.94 10*3/MM3 (ref 1.7–7)
NEUTROPHILS NFR BLD AUTO: 55.2 % (ref 42.7–76)
NITRITE UR QL STRIP: NEGATIVE
NRBC BLD AUTO-RTO: 0 /100 WBC (ref 0–0.2)
PH UR STRIP: 6.5 [PH] (ref 5–7.5)
PLATELET # BLD AUTO: 230 10*3/MM3 (ref 140–450)
POTASSIUM SERPL-SCNC: 4 MMOL/L (ref 3.5–5.2)
PROT SERPL-MCNC: 7.1 G/DL (ref 6–8.5)
PROT UR QL STRIP: NEGATIVE
RBC # BLD AUTO: 4.77 10*6/MM3 (ref 3.77–5.28)
RBC #/AREA URNS HPF: NORMAL /HPF
SODIUM SERPL-SCNC: 138 MMOL/L (ref 136–145)
SP GR UR: <=1.005 (ref 1–1.03)
TRIGL SERPL-MCNC: 60 MG/DL (ref 0–150)
TSH SERPL DL<=0.005 MIU/L-ACNC: 1.49 UIU/ML (ref 0.27–4.2)
URINALYSIS REFLEX: ABNORMAL
UROBILINOGEN UR STRIP-MCNC: 0.2 MG/DL (ref 0.2–1)
VLDLC SERPL CALC-MCNC: 12 MG/DL
WBC # BLD AUTO: 5.32 10*3/MM3 (ref 3.4–10.8)
WBC #/AREA URNS HPF: NORMAL /HPF

## 2019-09-24 ENCOUNTER — OFFICE VISIT (OUTPATIENT)
Dept: INTERNAL MEDICINE | Facility: CLINIC | Age: 64
End: 2019-09-24

## 2019-09-24 VITALS
HEART RATE: 82 BPM | DIASTOLIC BLOOD PRESSURE: 82 MMHG | BODY MASS INDEX: 18.48 KG/M2 | HEIGHT: 66 IN | SYSTOLIC BLOOD PRESSURE: 132 MMHG | OXYGEN SATURATION: 93 % | WEIGHT: 115 LBS

## 2019-09-24 DIAGNOSIS — Z00.00 WELL ADULT EXAM: Primary | ICD-10-CM

## 2019-09-24 PROBLEM — M85.859 OSTEOPENIA OF HIP: Status: RESOLVED | Noted: 2018-09-04 | Resolved: 2019-09-24

## 2019-09-24 PROBLEM — Z85.810 HISTORY OF TONGUE CANCER: Status: ACTIVE | Noted: 2019-09-24

## 2019-09-24 PROBLEM — M85.89 OSTEOPENIA OF MULTIPLE SITES: Status: ACTIVE | Noted: 2018-09-04

## 2019-09-24 PROBLEM — M81.0 OSTEOPOROSIS: Status: ACTIVE | Noted: 2018-09-04

## 2019-09-24 PROBLEM — E78.5 HYPERLIPIDEMIA: Status: ACTIVE | Noted: 2019-09-24

## 2019-09-24 PROCEDURE — 99396 PREV VISIT EST AGE 40-64: CPT | Performed by: INTERNAL MEDICINE

## 2019-09-24 RX ORDER — LEVOTHYROXINE SODIUM 75 MCG
75 TABLET ORAL DAILY
Qty: 90 TABLET | Refills: 3 | Status: SHIPPED | OUTPATIENT
Start: 2019-09-24 | End: 2020-09-30 | Stop reason: SDUPTHER

## 2019-09-24 NOTE — PROGRESS NOTES
Subjective     Joanne Martin is a 64 y.o. female who presents for a complete physical exam.      History of Present Illness     HLD.  Moderate elevation in cholesterol.  We discussed diet.  Hypothyroidism.  Good control.   OP.  Reviewed last BMD.  She has a history of head/neck radiation.     Review of Systems   Constitutional: Negative.    HENT: Negative.    Eyes: Negative.    Respiratory: Negative.    Cardiovascular: Negative.    Gastrointestinal: Negative.    Endocrine: Negative.    Genitourinary: Negative.    Musculoskeletal: Negative.    Skin: Negative.    Allergic/Immunologic: Negative.    Neurological: Negative.    Hematological: Negative.    Psychiatric/Behavioral: Negative.        The following portions of the patient's history were reviewed and updated as appropriate: allergies, current medications, past family history, past medical history, past social history, past surgical history and problem list.  Health maintenance tab was reviewed and updated with the patient.       Patient Active Problem List    Diagnosis Date Noted   • Hyperlipidemia 09/24/2019   • History of tongue cancer 09/24/2019     Note Last Updated: 9/24/2019     Initial in 1996.  Surgery.  Reoccurrence in 2008.  Radiation at that time.       • Eczema 09/04/2018   • Osteoporosis 09/04/2018   • Hypothyroidism 08/18/2016       Past Medical History:   Diagnosis Date   • Carcinoma of tongue (CMS/HCC)    • History of colonic polyps    • History of eczema    • History of fracture of clavicle    • Hypothyroidism        Past Surgical History:   Procedure Laterality Date   • BREAST SURGERY     • MOUTH SURGERY     • NASAL SEPTUM SURGERY      deviation repair       Family History   Problem Relation Age of Onset   • Colon cancer Father    • Macular degeneration Maternal Grandmother    • Thyroid disease Paternal Grandmother    • Coronary artery disease Other    • Macular degeneration Mother    • Melanoma Sister        Social History     Socioeconomic  "History   • Marital status: Unknown     Spouse name: Not on file   • Number of children: Not on file   • Years of education: Not on file   • Highest education level: Not on file   Occupational History   • Occupation:    Tobacco Use   • Smoking status: Never Smoker   • Smokeless tobacco: Never Used   Substance and Sexual Activity   • Alcohol use: No   • Drug use: No       Current Outpatient Medications on File Prior to Visit   Medication Sig Dispense Refill   • clobetasol (TEMOVATE) 0.05 % ointment   3   • mupirocin (BACTROBAN) 2 % ointment Apply  topically 3 (Three) Times a Day. 22 g 2   • SF 1.1 % gel U UTD.  0   • [DISCONTINUED] SYNTHROID 75 MCG tablet Take 1 tablet by mouth Daily. 30 tablet 4     No current facility-administered medications on file prior to visit.        Allergies   Allergen Reactions   • Cephalexin    • Hydrocortisone    • Latex    • Neomycin-Bacitracin Zn-Polymyx        Immunization History   Administered Date(s) Administered   • Hepatitis A 09/04/2018, 05/03/2019   • Tdap 05/01/2010   • Zostavax 11/13/2015       Objective     /82   Pulse 82   Ht 167.6 cm (65.98\")   Wt 52.2 kg (115 lb)   SpO2 93%   BMI 18.57 kg/m²     Physical Exam   Constitutional: She is oriented to person, place, and time. She appears well-developed and well-nourished.   HENT:   Head: Normocephalic and atraumatic.   Right Ear: Hearing, tympanic membrane and external ear normal.   Left Ear: Hearing, tympanic membrane and external ear normal.   Nose: Nose normal.   Mouth/Throat: Oropharynx is clear and moist.   Neck: Neck supple. No thyromegaly present.   Cardiovascular: Normal rate, regular rhythm and normal heart sounds.   No murmur heard.  Pulmonary/Chest: Effort normal and breath sounds normal. Right breast exhibits no mass. Left breast exhibits no mass.   Abdominal: Soft. She exhibits no distension. There is no hepatosplenomegaly. There is no tenderness.   Genitourinary: No breast tenderness. "   Lymphadenopathy:     She has no cervical adenopathy.   Neurological: She is alert and oriented to person, place, and time.   Skin: Skin is warm and dry.   Psychiatric: She has a normal mood and affect. Her speech is normal and behavior is normal. Judgment and thought content normal. Cognition and memory are normal.       Assessment/Plan   Joanne was seen today for annual exam.    Diagnoses and all orders for this visit:    Well adult exam    Other orders  -     SYNTHROID 75 MCG tablet; Take 1 tablet by mouth Daily.        Discussion    Patient presents today for a CPE.      Patient follows a healthy diet.   Patient follows an adequate exercise regimen. Mammogram is up to date.   Colon cancer screening is up to date.   DEXA is up to date.    HLD.  We discussed diet.  I recommend a diet high in fruits, vegetables, whole grains, lean meats, nuts and beans.  I recommend limiting red meat, full fat dairy, eggs and processed white carbohydrates.  I recommend aerobic exercise at least 3 days per week. I also recommend to watch salt intake.    Hypothyroidism.  Continue Synthroid.  OP.  I recommend to get 1200 mg of calcium and 1000 IUs of vitamin D through diet and supplements and to participate in a weight based exercise to prevent loss of bone mineral density. Bone mineral will be monitored every two years.  Defer currently on Rx given history of head and neck radiation.    I have recommended that the patient get the following immunizations:  Shingrix and flu.      Health Maintenance   Topic Date Due   • ZOSTER VACCINE (2 of 3) 01/08/2016   • INFLUENZA VACCINE  08/01/2019   • TDAP/TD VACCINES (2 - Td) 05/01/2020   • MAMMOGRAM  06/01/2020   • DXA SCAN  09/05/2020   • LIPID PANEL  09/17/2020   • ANNUAL PHYSICAL  09/25/2020   • COLONOSCOPY  10/27/2020   • PAP SMEAR  05/30/2021   • HEPATITIS C SCREENING  Completed            Future Appointments   Date Time Provider Department Center   9/23/2020  8:50 AM [facility] None   9/23/2020  9:00 AM [facility] None   9/30/2020  8:15 AM Saniya Walker MD [facility] None

## 2019-09-24 NOTE — PATIENT INSTRUCTIONS
"DASH Eating Plan  DASH stands for \"Dietary Approaches to Stop Hypertension.\" The DASH eating plan is a healthy eating plan that has been shown to reduce high blood pressure (hypertension). It may also reduce your risk for type 2 diabetes, heart disease, and stroke. The DASH eating plan may also help with weight loss.  What are tips for following this plan?    General guidelines  · Avoid eating more than 2,300 mg (milligrams) of salt (sodium) a day. If you have hypertension, you may need to reduce your sodium intake to 1,500 mg a day.  · Limit alcohol intake to no more than 1 drink a day for nonpregnant women and 2 drinks a day for men. One drink equals 12 oz of beer, 5 oz of wine, or 1½ oz of hard liquor.  · Work with your health care provider to maintain a healthy body weight or to lose weight. Ask what an ideal weight is for you.  · Get at least 30 minutes of exercise that causes your heart to beat faster (aerobic exercise) most days of the week. Activities may include walking, swimming, or biking.  · Work with your health care provider or diet and nutrition specialist (dietitian) to adjust your eating plan to your individual calorie needs.  Reading food labels    · Check food labels for the amount of sodium per serving. Choose foods with less than 5 percent of the Daily Value of sodium. Generally, foods with less than 300 mg of sodium per serving fit into this eating plan.  · To find whole grains, look for the word \"whole\" as the first word in the ingredient list.  Shopping  · Buy products labeled as \"low-sodium\" or \"no salt added.\"  · Buy fresh foods. Avoid canned foods and premade or frozen meals.  Cooking  · Avoid adding salt when cooking. Use salt-free seasonings or herbs instead of table salt or sea salt. Check with your health care provider or pharmacist before using salt substitutes.  · Do not bailon foods. Cook foods using healthy methods such as baking, boiling, grilling, and broiling instead.  · Cook with " heart-healthy oils, such as olive, canola, soybean, or sunflower oil.  Meal planning  · Eat a balanced diet that includes:  ? 5 or more servings of fruits and vegetables each day. At each meal, try to fill half of your plate with fruits and vegetables.  ? Up to 6-8 servings of whole grains each day.  ? Less than 6 oz of lean meat, poultry, or fish each day. A 3-oz serving of meat is about the same size as a deck of cards. One egg equals 1 oz.  ? 2 servings of low-fat dairy each day.  ? A serving of nuts, seeds, or beans 5 times each week.  ? Heart-healthy fats. Healthy fats called Omega-3 fatty acids are found in foods such as flaxseeds and coldwater fish, like sardines, salmon, and mackerel.  · Limit how much you eat of the following:  ? Canned or prepackaged foods.  ? Food that is high in trans fat, such as fried foods.  ? Food that is high in saturated fat, such as fatty meat.  ? Sweets, desserts, sugary drinks, and other foods with added sugar.  ? Full-fat dairy products.  · Do not salt foods before eating.  · Try to eat at least 2 vegetarian meals each week.  · Eat more home-cooked food and less restaurant, buffet, and fast food.  · When eating at a restaurant, ask that your food be prepared with less salt or no salt, if possible.  What foods are recommended?  The items listed may not be a complete list. Talk with your dietitian about what dietary choices are best for you.  Grains  Whole-grain or whole-wheat bread. Whole-grain or whole-wheat pasta. Brown rice. Oatmeal. Quinoa. Bulgur. Whole-grain and low-sodium cereals. Ellen bread. Low-fat, low-sodium crackers. Whole-wheat flour tortillas.  Vegetables  Fresh or frozen vegetables (raw, steamed, roasted, or grilled). Low-sodium or reduced-sodium tomato and vegetable juice. Low-sodium or reduced-sodium tomato sauce and tomato paste. Low-sodium or reduced-sodium canned vegetables.  Fruits  All fresh, dried, or frozen fruit. Canned fruit in natural juice (without  added sugar).  Meat and other protein foods  Skinless chicken or turkey. Ground chicken or turkey. Pork with fat trimmed off. Fish and seafood. Egg whites. Dried beans, peas, or lentils. Unsalted nuts, nut butters, and seeds. Unsalted canned beans. Lean cuts of beef with fat trimmed off. Low-sodium, lean deli meat.  Dairy  Low-fat (1%) or fat-free (skim) milk. Fat-free, low-fat, or reduced-fat cheeses. Nonfat, low-sodium ricotta or cottage cheese. Low-fat or nonfat yogurt. Low-fat, low-sodium cheese.  Fats and oils  Soft margarine without trans fats. Vegetable oil. Low-fat, reduced-fat, or light mayonnaise and salad dressings (reduced-sodium). Canola, safflower, olive, soybean, and sunflower oils. Avocado.  Seasoning and other foods  Herbs. Spices. Seasoning mixes without salt. Unsalted popcorn and pretzels. Fat-free sweets.  What foods are not recommended?  The items listed may not be a complete list. Talk with your dietitian about what dietary choices are best for you.  Grains  Baked goods made with fat, such as croissants, muffins, or some breads. Dry pasta or rice meal packs.  Vegetables  Creamed or fried vegetables. Vegetables in a cheese sauce. Regular canned vegetables (not low-sodium or reduced-sodium). Regular canned tomato sauce and paste (not low-sodium or reduced-sodium). Regular tomato and vegetable juice (not low-sodium or reduced-sodium). Pickles. Olives.  Fruits  Canned fruit in a light or heavy syrup. Fried fruit. Fruit in cream or butter sauce.  Meat and other protein foods  Fatty cuts of meat. Ribs. Fried meat. [place]. Sausage. Bologna and other processed lunch meats. Salami. Fatback. Hotdogs. Bratwurst. Salted nuts and seeds. Canned beans with added salt. Canned or smoked fish. Whole eggs or egg yolks. Chicken or turkey with skin.  Dairy  Whole or 2% milk, cream, and half-and-half. Whole or full-fat cream cheese. Whole-fat or sweetened yogurt. Full-fat cheese. Nondairy creamers. Whipped toppings.  Processed cheese and cheese spreads.  Fats and oils  Butter. Stick margarine. Lard. Shortening. Ghee. [place] fat. Tropical oils, such as coconut, palm kernel, or palm oil.  Seasoning and other foods  Salted popcorn and pretzels. Onion salt, garlic salt, seasoned salt, table salt, and sea salt. Worcestershire sauce. Tartar sauce. Barbecue sauce. Teriyaki sauce. Soy sauce, including reduced-sodium. Steak sauce. Canned and packaged gravies. Fish sauce. Oyster sauce. Cocktail sauce. Horseradish that you find on the shelf. Ketchup. Mustard. Meat flavorings and tenderizers. Bouillon cubes. Hot sauce and Tabasco sauce. Premade or packaged marinades. Premade or packaged taco seasonings. Relishes. Regular salad dressings.  Where to find more information:  · National Heart, Lung, and Blood [place]: www.nhlbi.nih.gov  · American Heart Association: www.heart.org  Summary  · The DASH eating plan is a healthy eating plan that has been shown to reduce high blood pressure (hypertension). It may also reduce your risk for type 2 diabetes, heart disease, and stroke.  · With the DASH eating plan, you should limit salt (sodium) intake to 2,300 mg a day. If you have hypertension, you may need to reduce your sodium intake to 1,500 mg a day.  · When on the DASH eating plan, aim to eat more fresh fruits and vegetables, whole grains, lean proteins, low-fat dairy, and heart-healthy fats.  · Work with your health care provider or diet and nutrition specialist (dietitian) to adjust your eating plan to your individual calorie needs.  This information is not intended to replace advice given to you by your health care provider. Make sure you discuss any questions you have with your health care provider.  Document Released: 12/06/2012 Document Revised: 12/11/2017 Document Reviewed: 12/11/2017  [employer] Interactive Patient Education © 2019 [employer]

## 2020-06-30 ENCOUNTER — APPOINTMENT (OUTPATIENT)
Dept: WOMENS IMAGING | Facility: HOSPITAL | Age: 65
End: 2020-06-30

## 2020-06-30 PROCEDURE — 77063 BREAST TOMOSYNTHESIS BI: CPT | Performed by: RADIOLOGY

## 2020-06-30 PROCEDURE — 77067 SCR MAMMO BI INCL CAD: CPT | Performed by: RADIOLOGY

## 2020-09-23 DIAGNOSIS — M81.0 OSTEOPOROSIS, UNSPECIFIED OSTEOPOROSIS TYPE, UNSPECIFIED PATHOLOGICAL FRACTURE PRESENCE: ICD-10-CM

## 2020-09-23 DIAGNOSIS — Z00.00 HEALTH CARE MAINTENANCE: Primary | ICD-10-CM

## 2020-09-23 LAB
25(OH)D3+25(OH)D2 SERPL-MCNC: 45.9 NG/ML (ref 30–100)
ALBUMIN SERPL-MCNC: 4.8 G/DL (ref 3.5–5.2)
ALBUMIN/GLOB SERPL: 2.2 G/DL
ALP SERPL-CCNC: 102 U/L (ref 39–117)
ALT SERPL-CCNC: 22 U/L (ref 1–33)
APPEARANCE UR: CLEAR
AST SERPL-CCNC: 23 U/L (ref 1–32)
BACTERIA #/AREA URNS HPF: NORMAL /HPF
BASOPHILS # BLD AUTO: 0.06 10*3/MM3 (ref 0–0.2)
BASOPHILS NFR BLD AUTO: 1.2 % (ref 0–1.5)
BILIRUB SERPL-MCNC: 0.5 MG/DL (ref 0–1.2)
BILIRUB UR QL STRIP: NEGATIVE
BUN SERPL-MCNC: 19 MG/DL (ref 8–23)
BUN/CREAT SERPL: 22.1 (ref 7–25)
CALCIUM SERPL-MCNC: 9.8 MG/DL (ref 8.6–10.5)
CASTS URNS MICRO: NORMAL
CHLORIDE SERPL-SCNC: 102 MMOL/L (ref 98–107)
CHOLEST SERPL-MCNC: 253 MG/DL (ref 0–200)
CO2 SERPL-SCNC: 26.4 MMOL/L (ref 22–29)
COLOR UR: YELLOW
CREAT SERPL-MCNC: 0.86 MG/DL (ref 0.57–1)
EOSINOPHIL # BLD AUTO: 0.48 10*3/MM3 (ref 0–0.4)
EOSINOPHIL NFR BLD AUTO: 9.2 % (ref 0.3–6.2)
EPI CELLS #/AREA URNS HPF: NORMAL /HPF
ERYTHROCYTE [DISTWIDTH] IN BLOOD BY AUTOMATED COUNT: 12.9 % (ref 12.3–15.4)
GLOBULIN SER CALC-MCNC: 2.2 GM/DL
GLUCOSE SERPL-MCNC: 102 MG/DL (ref 65–99)
GLUCOSE UR QL: NEGATIVE
HCT VFR BLD AUTO: 44.2 % (ref 34–46.6)
HDLC SERPL-MCNC: 110 MG/DL (ref 40–60)
HGB BLD-MCNC: 14.8 G/DL (ref 12–15.9)
HGB UR QL STRIP: ABNORMAL
IMM GRANULOCYTES # BLD AUTO: 0.01 10*3/MM3 (ref 0–0.05)
IMM GRANULOCYTES NFR BLD AUTO: 0.2 % (ref 0–0.5)
KETONES UR QL STRIP: NEGATIVE
LDLC SERPL CALC-MCNC: 131 MG/DL (ref 0–100)
LEUKOCYTE ESTERASE UR QL STRIP: ABNORMAL
LYMPHOCYTES # BLD AUTO: 1.62 10*3/MM3 (ref 0.7–3.1)
LYMPHOCYTES NFR BLD AUTO: 31.1 % (ref 19.6–45.3)
MCH RBC QN AUTO: 31 PG (ref 26.6–33)
MCHC RBC AUTO-ENTMCNC: 33.5 G/DL (ref 31.5–35.7)
MCV RBC AUTO: 92.7 FL (ref 79–97)
MONOCYTES # BLD AUTO: 0.41 10*3/MM3 (ref 0.1–0.9)
MONOCYTES NFR BLD AUTO: 7.9 % (ref 5–12)
NEUTROPHILS # BLD AUTO: 2.63 10*3/MM3 (ref 1.7–7)
NEUTROPHILS NFR BLD AUTO: 50.4 % (ref 42.7–76)
NITRITE UR QL STRIP: NEGATIVE
NRBC BLD AUTO-RTO: 0 /100 WBC (ref 0–0.2)
PH UR STRIP: 6 [PH] (ref 5–8)
PLATELET # BLD AUTO: 257 10*3/MM3 (ref 140–450)
POTASSIUM SERPL-SCNC: 4.5 MMOL/L (ref 3.5–5.2)
PROT SERPL-MCNC: 7 G/DL (ref 6–8.5)
PROT UR QL STRIP: NEGATIVE
RBC # BLD AUTO: 4.77 10*6/MM3 (ref 3.77–5.28)
RBC #/AREA URNS HPF: NORMAL /HPF
SODIUM SERPL-SCNC: 141 MMOL/L (ref 136–145)
SP GR UR: 1.02 (ref 1–1.03)
TRIGL SERPL-MCNC: 60 MG/DL (ref 0–150)
TSH SERPL DL<=0.005 MIU/L-ACNC: 2.91 UIU/ML (ref 0.27–4.2)
UROBILINOGEN UR STRIP-MCNC: ABNORMAL MG/DL
VLDLC SERPL CALC-MCNC: 12 MG/DL
WBC # BLD AUTO: 5.21 10*3/MM3 (ref 3.4–10.8)
WBC #/AREA URNS HPF: NORMAL /HPF

## 2020-09-30 ENCOUNTER — OFFICE VISIT (OUTPATIENT)
Dept: INTERNAL MEDICINE | Facility: CLINIC | Age: 65
End: 2020-09-30

## 2020-09-30 VITALS
HEART RATE: 62 BPM | DIASTOLIC BLOOD PRESSURE: 80 MMHG | WEIGHT: 114 LBS | HEIGHT: 66 IN | SYSTOLIC BLOOD PRESSURE: 130 MMHG | OXYGEN SATURATION: 98 % | BODY MASS INDEX: 18.32 KG/M2

## 2020-09-30 DIAGNOSIS — Z00.00 WELL ADULT EXAM: Primary | ICD-10-CM

## 2020-09-30 DIAGNOSIS — E78.5 HYPERLIPIDEMIA, UNSPECIFIED HYPERLIPIDEMIA TYPE: ICD-10-CM

## 2020-09-30 DIAGNOSIS — E03.9 ACQUIRED HYPOTHYROIDISM: ICD-10-CM

## 2020-09-30 DIAGNOSIS — M81.0 OSTEOPOROSIS, UNSPECIFIED OSTEOPOROSIS TYPE, UNSPECIFIED PATHOLOGICAL FRACTURE PRESENCE: ICD-10-CM

## 2020-09-30 PROBLEM — Z92.3 HISTORY OF HEAD AND NECK RADIATION: Status: ACTIVE | Noted: 2020-09-30

## 2020-09-30 PROCEDURE — 99396 PREV VISIT EST AGE 40-64: CPT | Performed by: INTERNAL MEDICINE

## 2020-09-30 RX ORDER — LEVOTHYROXINE SODIUM 0.07 MG/1
75 TABLET ORAL DAILY
Qty: 90 TABLET | Refills: 3 | Status: SHIPPED | OUTPATIENT
Start: 2020-09-30 | End: 2021-07-27 | Stop reason: SDUPTHER

## 2020-09-30 NOTE — PROGRESS NOTES
Subjective     Joanne Martin is a 65 y.o. female who presents for a complete physical exam.      History of Present Illness     OP.  We discussed calcium and D.  She is due for DEXA.  With her history of head/neck radiation I wouldn't feel comfortable with bisphosphonates or Prolia.  I would consider Forteo.    HLD.  Ratios are good.  Discussed diet.  Hypothyroidism.  Good control.      Review of Systems   Constitutional: Negative.    HENT: Negative.    Eyes: Negative.    Respiratory: Negative.    Cardiovascular: Negative.    Gastrointestinal: Negative.    Endocrine: Negative.    Genitourinary: Negative.    Musculoskeletal: Negative.    Skin: Negative.    Allergic/Immunologic: Negative.    Neurological: Negative.    Hematological: Negative.    Psychiatric/Behavioral: Negative.        The following portions of the patient's history were reviewed and updated as appropriate: allergies, current medications, past family history, past medical history, past social history, past surgical history and problem list.  Health maintenance tab was reviewed and updated with the patient.       Patient Active Problem List    Diagnosis Date Noted   • History of head and neck radiation 09/30/2020   • Hyperlipidemia 09/24/2019     Note Last Updated: 9/30/2020     3.9% 10 year risk on 9/30/2020     • History of tongue cancer 09/24/2019     Note Last Updated: 9/24/2019     Initial in 1996.  Surgery.  Reoccurrence in 2008.  Radiation at that time.       • Eczema 09/04/2018   • Osteoporosis 09/04/2018   • Hypothyroidism 08/18/2016       Past Medical History:   Diagnosis Date   • Carcinoma of tongue (CMS/HCC)    • History of colonic polyps    • History of eczema    • History of fracture of clavicle    • Hypothyroidism        Past Surgical History:   Procedure Laterality Date   • BREAST SURGERY     • MOUTH SURGERY     • NASAL SEPTUM SURGERY      deviation repair       Family History   Problem Relation Age of Onset   • Colon cancer Father    •  "Macular degeneration Maternal Grandmother    • Thyroid disease Paternal Grandmother    • Coronary artery disease Other    • Macular degeneration Mother    • Melanoma Sister        Social History     Socioeconomic History   • Marital status: Unknown     Spouse name: Not on file   • Number of children: Not on file   • Years of education: Not on file   • Highest education level: Not on file   Occupational History   • Occupation:    Tobacco Use   • Smoking status: Never Smoker   • Smokeless tobacco: Never Used   Substance and Sexual Activity   • Alcohol use: No   • Drug use: No       Current Outpatient Medications on File Prior to Visit   Medication Sig Dispense Refill   • clobetasol (TEMOVATE) 0.05 % ointment   3   • mupirocin (BACTROBAN) 2 % ointment Apply  topically 3 (Three) Times a Day. 22 g 2   • SF 1.1 % gel U UTD.  0   • [DISCONTINUED] SYNTHROID 75 MCG tablet Take 1 tablet by mouth Daily. 90 tablet 3     No current facility-administered medications on file prior to visit.        Allergies   Allergen Reactions   • Cephalexin    • Hydrocortisone    • Latex    • Neomycin-Bacitracin Zn-Polymyx        Immunization History   Administered Date(s) Administered   • Flulaval/Fluarix Quad 10/30/2019   • Hepatitis A 09/04/2018, 05/03/2019   • Tdap 05/01/2010   • Zostavax 11/13/2015       Objective     /80   Pulse 62   Ht 167.6 cm (65.98\")   Wt 51.7 kg (114 lb)   SpO2 98%   BMI 18.41 kg/m²     Physical Exam  Constitutional:       Appearance: She is well-developed.   HENT:      Head: Normocephalic and atraumatic.      Right Ear: Hearing, tympanic membrane and external ear normal.      Left Ear: Hearing, tympanic membrane and external ear normal.      Nose: Nose normal.   Neck:      Musculoskeletal: Neck supple.      Thyroid: No thyromegaly.   Cardiovascular:      Rate and Rhythm: Normal rate and regular rhythm.      Heart sounds: Normal heart sounds. No murmur.   Pulmonary:      Effort: Pulmonary " effort is normal.      Breath sounds: Normal breath sounds.   Chest:      Breasts:         Right: No mass.         Left: No mass.   Abdominal:      General: There is no distension.      Palpations: Abdomen is soft.      Tenderness: There is no abdominal tenderness.   Lymphadenopathy:      Cervical: No cervical adenopathy.   Skin:     General: Skin is warm and dry.   Neurological:      Mental Status: She is alert and oriented to person, place, and time.   Psychiatric:         Speech: Speech normal.         Behavior: Behavior normal.         Thought Content: Thought content normal.         Judgment: Judgment normal.         Assessment/Plan   Joanne was seen today for annual exam.    Diagnoses and all orders for this visit:    Well adult exam    Osteoporosis, unspecified osteoporosis type, unspecified pathological fracture presence  -     Cancel: DEXA Bone Density Axial  -     DEXA Bone Density Axial    Hyperlipidemia, unspecified hyperlipidemia type    Acquired hypothyroidism    Other orders  -     levothyroxine (Synthroid) 75 MCG tablet; Take 1 tablet by mouth Daily.        Discussion    Patient presents today for a CPE.      Patient follows a healthy diet.   Patient follows an adequate exercise regimen. Mammogram is up to date.   Colon cancer screening is up to date.   Pap smears are performed by the patient's gynecologist.   Patient will schedule a DEXA.  I have recommended that the patient get the following immunizations:  Shingrix, tdap and flu.  HLD.  We discussed diet.  I recommend a diet high in fruits, vegetables, whole grains, lean meats, nuts and beans.  I recommend limiting red meat, full fat dairy, eggs and processed white carbohydrates.  I recommend aerobic exercise at least 3 days per week.   OP.  I recommend to get 1200 mg of calcium and 1000 IUs of vitamin D through diet and supplements and to participate in a weight based exercise to prevent loss of bone mineral density. Bone mineral will be monitored  every two years.   Consider Forteo.    Hypothyroidism.  The patient will continue current regimen.            Health Maintenance   Topic Date Due   • ZOSTER VACCINE (2 of 3) 01/08/2016   • TDAP/TD VACCINES (2 - Td) 05/01/2020   • INFLUENZA VACCINE  08/01/2020   • DXA SCAN  09/05/2020   • COLONOSCOPY  10/27/2020   • PAP SMEAR  05/30/2021   • MAMMOGRAM  06/29/2021   • LIPID PANEL  09/23/2021   • ANNUAL PHYSICAL  10/01/2021   • HEPATITIS C SCREENING  Completed   • Pneumococcal Vaccine 0-64  Aged Out            Future Appointments   Date Time Provider Department Center   9/29/2021  9:00 AM [facility] None   10/5/2021  8:15 AM Saniya Walker [facility] None

## 2020-12-17 ENCOUNTER — APPOINTMENT (OUTPATIENT)
Dept: WOMENS IMAGING | Facility: HOSPITAL | Age: 65
End: 2020-12-17

## 2020-12-17 PROCEDURE — 77080 DXA BONE DENSITY AXIAL: CPT | Performed by: RADIOLOGY

## 2021-03-16 ENCOUNTER — BULK ORDERING (OUTPATIENT)
Dept: CASE MANAGEMENT | Facility: OTHER | Age: 66
End: 2021-03-16

## 2021-03-16 DIAGNOSIS — Z23 IMMUNIZATION DUE: ICD-10-CM

## 2021-07-01 ENCOUNTER — APPOINTMENT (OUTPATIENT)
Dept: WOMENS IMAGING | Facility: HOSPITAL | Age: 66
End: 2021-07-01

## 2021-07-01 PROCEDURE — 77063 BREAST TOMOSYNTHESIS BI: CPT | Performed by: RADIOLOGY

## 2021-07-01 PROCEDURE — 77067 SCR MAMMO BI INCL CAD: CPT | Performed by: RADIOLOGY

## 2021-07-27 ENCOUNTER — TELEPHONE (OUTPATIENT)
Dept: INTERNAL MEDICINE | Facility: CLINIC | Age: 66
End: 2021-07-27

## 2021-07-27 RX ORDER — LEVOTHYROXINE SODIUM 0.07 MG/1
75 TABLET ORAL DAILY
Qty: 90 TABLET | Refills: 3 | Status: SHIPPED | OUTPATIENT
Start: 2021-07-27 | End: 2021-10-05 | Stop reason: SDUPTHER

## 2021-07-27 NOTE — TELEPHONE ENCOUNTER
Caller: Joanne Martin    Relationship: Self    Best call back number: [phone]    Medication needed:   Requested Prescriptions     Pending Prescriptions Disp Refills   • levothyroxine (Synthroid) 75 MCG tablet 90 tablet 3     Sig: Take 1 tablet by mouth Daily.       When do you need the refill by: 7/30    What additional details did the patient provide when requesting the medication: PATIENT HAS ENOUGH TO GET HER THROUGH THE REST OF THIS WEEK, BUT HAS NO REFILLS AVAILABLE TO LAST UNTIL HER APPOINTMENT IN October.  PATIENT IS REQUESTING 3 REFILLS JUST UNTIL THE APPOINTMENT.    Does the patient have less than a 3 day supply:  [] Yes  [x] No    What is the patient's preferred pharmacy: [id number] [address] [facility] [phone] [facility] [phone] FX

## 2021-08-17 ENCOUNTER — PREP FOR SURGERY (OUTPATIENT)
Dept: OTHER | Facility: HOSPITAL | Age: 66
End: 2021-08-17

## 2021-08-17 DIAGNOSIS — Z86.010 HISTORY OF ADENOMATOUS POLYP OF COLON: Primary | ICD-10-CM

## 2021-08-31 PROBLEM — Z86.0101 HISTORY OF ADENOMATOUS POLYP OF COLON: Status: ACTIVE | Noted: 2021-08-31

## 2021-08-31 PROBLEM — Z86.010 HISTORY OF ADENOMATOUS POLYP OF COLON: Status: ACTIVE | Noted: 2021-08-31

## 2021-09-24 DIAGNOSIS — E03.9 ACQUIRED HYPOTHYROIDISM: ICD-10-CM

## 2021-09-24 DIAGNOSIS — M81.0 OSTEOPOROSIS, UNSPECIFIED OSTEOPOROSIS TYPE, UNSPECIFIED PATHOLOGICAL FRACTURE PRESENCE: ICD-10-CM

## 2021-09-24 DIAGNOSIS — E78.5 HYPERLIPIDEMIA, UNSPECIFIED HYPERLIPIDEMIA TYPE: Primary | ICD-10-CM

## 2021-09-29 LAB
25(OH)D3+25(OH)D2 SERPL-MCNC: 45.4 NG/ML (ref 30–100)
ALBUMIN SERPL-MCNC: 4.4 G/DL (ref 3.5–5.2)
ALBUMIN/GLOB SERPL: 2.2 G/DL
ALP SERPL-CCNC: 88 U/L (ref 39–117)
ALT SERPL-CCNC: 18 U/L (ref 1–33)
APPEARANCE UR: CLEAR
AST SERPL-CCNC: 23 U/L (ref 1–32)
BACTERIA #/AREA URNS HPF: NORMAL /HPF
BASOPHILS # BLD AUTO: 0.07 10*3/MM3 (ref 0–0.2)
BASOPHILS NFR BLD AUTO: 1.3 % (ref 0–1.5)
BILIRUB SERPL-MCNC: 0.5 MG/DL (ref 0–1.2)
BILIRUB UR QL STRIP: NEGATIVE
BUN SERPL-MCNC: 14 MG/DL (ref 8–23)
BUN/CREAT SERPL: 18.4 (ref 7–25)
CALCIUM SERPL-MCNC: 9.6 MG/DL (ref 8.6–10.5)
CASTS URNS MICRO: NORMAL
CHLORIDE SERPL-SCNC: 99 MMOL/L (ref 98–107)
CHOLEST SERPL-MCNC: 248 MG/DL (ref 0–200)
CO2 SERPL-SCNC: 23.6 MMOL/L (ref 22–29)
COLOR UR: YELLOW
CREAT SERPL-MCNC: 0.76 MG/DL (ref 0.57–1)
EOSINOPHIL # BLD AUTO: 0.37 10*3/MM3 (ref 0–0.4)
EOSINOPHIL NFR BLD AUTO: 6.7 % (ref 0.3–6.2)
EPI CELLS #/AREA URNS HPF: NORMAL /HPF
ERYTHROCYTE [DISTWIDTH] IN BLOOD BY AUTOMATED COUNT: 12.8 % (ref 12.3–15.4)
GLOBULIN SER CALC-MCNC: 2 GM/DL
GLUCOSE SERPL-MCNC: 91 MG/DL (ref 65–99)
GLUCOSE UR QL: NEGATIVE
HCT VFR BLD AUTO: 41 % (ref 34–46.6)
HDLC SERPL-MCNC: 97 MG/DL (ref 40–60)
HGB BLD-MCNC: 13.9 G/DL (ref 12–15.9)
HGB UR QL STRIP: ABNORMAL
IMM GRANULOCYTES # BLD AUTO: 0.01 10*3/MM3 (ref 0–0.05)
IMM GRANULOCYTES NFR BLD AUTO: 0.2 % (ref 0–0.5)
KETONES UR QL STRIP: NEGATIVE
LDLC SERPL CALC-MCNC: 143 MG/DL (ref 0–100)
LEUKOCYTE ESTERASE UR QL STRIP: NEGATIVE
LYMPHOCYTES # BLD AUTO: 1.75 10*3/MM3 (ref 0.7–3.1)
LYMPHOCYTES NFR BLD AUTO: 31.5 % (ref 19.6–45.3)
MCH RBC QN AUTO: 31 PG (ref 26.6–33)
MCHC RBC AUTO-ENTMCNC: 33.9 G/DL (ref 31.5–35.7)
MCV RBC AUTO: 91.3 FL (ref 79–97)
MONOCYTES # BLD AUTO: 0.56 10*3/MM3 (ref 0.1–0.9)
MONOCYTES NFR BLD AUTO: 10.1 % (ref 5–12)
NEUTROPHILS # BLD AUTO: 2.8 10*3/MM3 (ref 1.7–7)
NEUTROPHILS NFR BLD AUTO: 50.2 % (ref 42.7–76)
NITRITE UR QL STRIP: NEGATIVE
NRBC BLD AUTO-RTO: 0 /100 WBC (ref 0–0.2)
PH UR STRIP: 6.5 [PH] (ref 5–8)
PLATELET # BLD AUTO: 223 10*3/MM3 (ref 140–450)
POTASSIUM SERPL-SCNC: 4.1 MMOL/L (ref 3.5–5.2)
PROT SERPL-MCNC: 6.4 G/DL (ref 6–8.5)
PROT UR QL STRIP: NEGATIVE
RBC # BLD AUTO: 4.49 10*6/MM3 (ref 3.77–5.28)
RBC #/AREA URNS HPF: NORMAL /HPF
SODIUM SERPL-SCNC: 135 MMOL/L (ref 136–145)
SP GR UR: 1.01 (ref 1–1.03)
TRIGL SERPL-MCNC: 52 MG/DL (ref 0–150)
TSH SERPL DL<=0.005 MIU/L-ACNC: 0.91 UIU/ML (ref 0.27–4.2)
UROBILINOGEN UR STRIP-MCNC: ABNORMAL MG/DL
VLDLC SERPL CALC-MCNC: 8 MG/DL (ref 5–40)
WBC # BLD AUTO: 5.56 10*3/MM3 (ref 3.4–10.8)
WBC #/AREA URNS HPF: NORMAL /HPF

## 2021-09-30 ENCOUNTER — ANESTHESIA (OUTPATIENT)
Dept: GASTROENTEROLOGY | Facility: HOSPITAL | Age: 66
End: 2021-09-30

## 2021-09-30 ENCOUNTER — HOSPITAL ENCOUNTER (OUTPATIENT)
Facility: HOSPITAL | Age: 66
Setting detail: HOSPITAL OUTPATIENT SURGERY
Discharge: HOME | End: 2021-09-30
Attending: SURGERY | Admitting: SURGERY

## 2021-09-30 ENCOUNTER — ANESTHESIA EVENT (OUTPATIENT)
Dept: GASTROENTEROLOGY | Facility: HOSPITAL | Age: 66
End: 2021-09-30

## 2021-09-30 VITALS
SYSTOLIC BLOOD PRESSURE: 121 MMHG | TEMPERATURE: 97.8 F | BODY MASS INDEX: 18.23 KG/M2 | OXYGEN SATURATION: 100 % | HEIGHT: 66 IN | DIASTOLIC BLOOD PRESSURE: 78 MMHG | HEART RATE: 69 BPM | WEIGHT: 113.4 LBS | RESPIRATION RATE: 16 BRPM

## 2021-09-30 DIAGNOSIS — Z86.010 HISTORY OF ADENOMATOUS POLYP OF COLON: ICD-10-CM

## 2021-09-30 PROCEDURE — S0260 H&P FOR SURGERY: HCPCS | Performed by: SURGERY

## 2021-09-30 PROCEDURE — 45385 COLONOSCOPY W/LESION REMOVAL: CPT | Performed by: SURGERY

## 2021-09-30 PROCEDURE — 45380 COLONOSCOPY AND BIOPSY: CPT | Performed by: SURGERY

## 2021-09-30 PROCEDURE — 88305 TISSUE EXAM BY PATHOLOGIST: CPT | Performed by: SURGERY

## 2021-09-30 PROCEDURE — 45381 COLONOSCOPY SUBMUCOUS NJX: CPT | Performed by: SURGERY

## 2021-09-30 PROCEDURE — 25010000002 PROPOFOL 10 MG/ML EMULSION: Performed by: ANESTHESIOLOGY

## 2021-09-30 RX ORDER — PROPOFOL 10 MG/ML
VIAL (ML) INTRAVENOUS AS NEEDED
Status: DISCONTINUED | OUTPATIENT
Start: 2021-09-30 | End: 2021-09-30 | Stop reason: SURG

## 2021-09-30 RX ORDER — SODIUM CHLORIDE, SODIUM LACTATE, POTASSIUM CHLORIDE, CALCIUM CHLORIDE 600; 310; 30; 20 MG/100ML; MG/100ML; MG/100ML; MG/100ML
30 INJECTION, SOLUTION INTRAVENOUS CONTINUOUS PRN
Status: DISCONTINUED | OUTPATIENT
Start: 2021-09-30 | End: 2021-09-30 | Stop reason: HOSPADM

## 2021-09-30 RX ORDER — LIDOCAINE HYDROCHLORIDE 20 MG/ML
INJECTION, SOLUTION INFILTRATION; PERINEURAL AS NEEDED
Status: DISCONTINUED | OUTPATIENT
Start: 2021-09-30 | End: 2021-09-30 | Stop reason: SURG

## 2021-09-30 RX ADMIN — SODIUM CHLORIDE, POTASSIUM CHLORIDE, SODIUM LACTATE AND CALCIUM CHLORIDE 30 ML/HR: 600; 310; 30; 20 INJECTION, SOLUTION INTRAVENOUS at 08:23

## 2021-09-30 RX ADMIN — PROPOFOL 320 MG: 10 INJECTION, EMULSION INTRAVENOUS at 08:28

## 2021-09-30 RX ADMIN — LIDOCAINE HYDROCHLORIDE 100 MG: 20 INJECTION, SOLUTION INFILTRATION; PERINEURAL at 08:29

## 2021-09-30 NOTE — ANESTHESIA PREPROCEDURE EVALUATION
Anesthesia Evaluation     Patient summary reviewed and Nursing notes reviewed   NPO Solid Status: > 8 hours             Airway   Mallampati: II  TM distance: >3 FB  Neck ROM: full  no difficulty expected  Dental - normal exam     Pulmonary - normal exam   Cardiovascular - normal exam    (+) hyperlipidemia,       Neuro/Psych  GI/Hepatic/Renal/Endo      Musculoskeletal     Abdominal  - normal exam   Substance History      OB/GYN          Other      history of cancer remission                    Anesthesia Plan    ASA 2     MAC       Anesthetic plan, all risks, benefits, and alternatives have been provided, discussed and informed consent has been obtained with: patient.

## 2021-09-30 NOTE — ANESTHESIA POSTPROCEDURE EVALUATION
"Patient: Joanne Martin    Procedure Summary     Date: 09/30/21 Room / Location:  [facility]    Anesthesia Start: 0825 Anesthesia Stop: 0854    Procedure: COLONOSCOPY TO CECUM/TI WITH POLYPECTOMY ( HOT SNARE/ COLD BX) SALINE LIFT (N/A ) Diagnosis:       History of adenomatous polyp of colon      (History of adenomatous polyp of colon [Z86.010])    Surgeons: Sourav Romo MD Provider: Roger Cantu MD    Anesthesia Type: MAC ASA Status: 2          Anesthesia Type: MAC    Vitals  Vitals Value Taken Time   /78 09/30/21 0913   Temp     Pulse 69 09/30/21 0913   Resp 16 09/30/21 0913   SpO2 100 % 09/30/21 0913           Post Anesthesia Care and Evaluation    Patient location during evaluation: bedside  Patient participation: complete - patient participated  Level of consciousness: awake and alert  Pain management: adequate  Airway patency: patent  Anesthetic complications: No anesthetic complications    Cardiovascular status: acceptable  Respiratory status: acceptable  Hydration status: acceptable    Comments: /78 (BP Location: Left arm, Patient Position: Sitting)   Pulse 69   Temp 36.6 °C (97.8 °F) (Oral)   Resp 16   Ht 167.6 cm (66\")   Wt 51.4 kg (113 lb 6.4 oz)   SpO2 100%   BMI 18.30 kg/m²       "

## 2021-10-01 LAB
LAB AP CASE REPORT: NORMAL
PATH REPORT.FINAL DX SPEC: NORMAL
PATH REPORT.GROSS SPEC: NORMAL

## 2021-10-04 ENCOUNTER — TELEPHONE (OUTPATIENT)
Dept: SURGERY | Facility: CLINIC | Age: 66
End: 2021-10-04

## 2021-10-04 NOTE — TELEPHONE ENCOUNTER
PT calling about path of the c-scope done on 09/30/21    I called and spoke with Joanne Martin regarding her colonoscopy results.     Pathology:   Final Diagnosis   1. Rectum, Polyps, Polypectomies:               A. Tubular adenoma and hyperplastic polyps.     I recommend that she undergoes colonoscopy in 3 years for surveillance.   She has been dealing with with constipation now for several years.  Discussed with her about the need to start taking fiber supplementation, increase water intake and take stool softeners.      She verbalized understanding and agreed    Sourav Romo MD  General, Minimally Invasive and Endoscopic Surgery  [facility]    [address] [place] [address], [address]  [place], [place], [address]  P: [phone]  F: [phone]

## 2021-10-05 ENCOUNTER — OFFICE VISIT (OUTPATIENT)
Dept: INTERNAL MEDICINE | Facility: CLINIC | Age: 66
End: 2021-10-05

## 2021-10-05 VITALS
HEIGHT: 66 IN | OXYGEN SATURATION: 99 % | BODY MASS INDEX: 18.8 KG/M2 | SYSTOLIC BLOOD PRESSURE: 118 MMHG | WEIGHT: 117 LBS | HEART RATE: 84 BPM | DIASTOLIC BLOOD PRESSURE: 80 MMHG | TEMPERATURE: 96.8 F

## 2021-10-05 DIAGNOSIS — Z00.00 WELL ADULT EXAM: Primary | ICD-10-CM

## 2021-10-05 PROCEDURE — 90471 IMMUNIZATION ADMIN: CPT | Performed by: INTERNAL MEDICINE

## 2021-10-05 PROCEDURE — 90715 TDAP VACCINE 7 YRS/> IM: CPT | Performed by: INTERNAL MEDICINE

## 2021-10-05 PROCEDURE — 99396 PREV VISIT EST AGE 40-64: CPT | Performed by: INTERNAL MEDICINE

## 2021-10-05 RX ORDER — LEVOTHYROXINE SODIUM 0.07 MG/1
75 TABLET ORAL DAILY
Qty: 90 TABLET | Refills: 3 | Status: SHIPPED | OUTPATIENT
Start: 2021-10-05 | End: 2021-10-14

## 2021-10-05 NOTE — PROGRESS NOTES
Subjective     Joanne Martin is a 66 y.o. female who presents for a complete physical exam.      History of Present Illness     The following data was reviewed by: Saniya Walker MD on 10/05/2021:  Common labs    Common Labsle 9/28/21 9/28/21 9/28/21    0907 [id number]   Glucose  91    BUN  14    Creatinine  0.76    eGFR Non  Am  77    eGFR  Am  93    Sodium  135 (A)    Potassium  4.1    Chloride  99    Calcium  9.6    Total Protein  6.4    Albumin  4.40    Total Bilirubin  0.5    Alkaline Phosphatase  88    AST (SGOT)  23    ALT (SGPT)  18    WBC 5.56     Hemoglobin 13.9     Hematocrit 41.0     Platelets 223     Total Cholesterol   248 (A)   Triglycerides   52   HDL Cholesterol   97 (A)   LDL Cholesterol    143 (A)   (A) Abnormal value       Comments are available for some flowsheets but are not being displayed.           HLD.  I calculated the patient's 10-year risk of heart disease or stroke using the ASCVD algorithm published by the ACC/AHA.  The patient 10-year risk is calculated at 3%.   We discussed diet and exercise.  Osteopenia.  We discussed diet and exercise.      Review of Systems   Constitutional: Negative.    HENT: Negative.    Eyes: Negative.    Respiratory: Negative.    Cardiovascular: Negative.    Gastrointestinal: Negative.    Endocrine: Negative.    Genitourinary: Negative.    Musculoskeletal: Negative.    Skin: Negative.    Allergic/Immunologic: Negative.    Neurological: Negative.    Hematological: Negative.    Psychiatric/Behavioral: Negative.        The following portions of the patient's history were reviewed and updated as appropriate: allergies, current medications, past family history, past medical history, past social history, past surgical history and problem list.  Health maintenance tab was reviewed and updated with the patient.       Patient Active Problem List    Diagnosis Date Noted   • History of adenomatous polyp of colon 08/31/2021     Note Last Updated: 8/31/2021      Added automatically from request for surgery [id number]     • History of head and neck radiation 09/30/2020   • Hyperlipidemia 09/24/2019     Note Last Updated: 10/5/2021     3.9% 10 year risk on 9/30/2020, 3.7% 10 year risk on 10/5/2021.     • History of tongue cancer 09/24/2019     Note Last Updated: 9/24/2019     Initial in 1996.  Surgery.  Reoccurrence in 2008.  Radiation at that time.       • Eczema 09/04/2018   • Osteopenia of multiple sites 09/04/2018   • Hypothyroidism 08/18/2016       Past Medical History:   Diagnosis Date   • Carcinoma of tongue (HCC)    • History of colonic polyps    • History of eczema    • History of fracture of clavicle    • Hypothyroidism        Past Surgical History:   Procedure Laterality Date   • BREAST SURGERY     • COLONOSCOPY N/A 9/30/2021    Procedure: COLONOSCOPY TO CECUM/TI WITH POLYPECTOMY ( HOT SNARE/ COLD BX) SALINE LIFT;  Surgeon: Sourav Romo MD;  Location: [facility] ENDOSCOPY;  Service: General;  Laterality: N/A;  HX OF COLON POLYP; FAM HX OF COLON CANCER  POST: COLON POLYP   • MOUTH SURGERY     • NASAL SEPTUM SURGERY      deviation repair       Family History   Problem Relation Age of Onset   • Colon cancer Father    • Macular degeneration Maternal Grandmother    • Thyroid disease Paternal Grandmother    • Coronary artery disease Other    • Macular degeneration Mother    • Melanoma Sister        Social History     Socioeconomic History   • Marital status: Single     Spouse name: Not on file   • Number of children: Not on file   • Years of education: Not on file   • Highest education level: Not on file   Tobacco Use   • Smoking status: Never Smoker   • Smokeless tobacco: Never Used   Substance and Sexual Activity   • Alcohol use: No   • Drug use: No       Current Outpatient Medications on File Prior to Visit   Medication Sig Dispense Refill   • [DISCONTINUED] clobetasol (TEMOVATE) 0.05 % ointment   3   • [DISCONTINUED] levothyroxine (Synthroid) 75 MCG tablet  "Take 1 tablet by mouth Daily. 90 tablet 3   • [DISCONTINUED] mupirocin (BACTROBAN) 2 % ointment Apply  topically 3 (Three) Times a Day. 22 g 2   • [DISCONTINUED] SF 1.1 % gel U UTD.  0     No current facility-administered medications on file prior to visit.       Allergies   Allergen Reactions   • Cephalexin    • Hydrocortisone    • Latex    • Neomycin-Bacitracin Zn-Polymyx        Immunization History   Administered Date(s) Administered   • COVID-19 (PFIZER) 03/09/2021, 03/31/2021   • Flu Vaccine Quad PF >18YRS 10/08/2020   • Flu Vaccine Quad PF >36MO 10/08/2020   • FluLaval/Fluarix >6 Months 10/30/2019   • Hepatitis A 09/04/2018, 05/03/2019   • Tdap 05/01/2010, 10/05/2021   • Zostavax 11/13/2015       Objective     /80   Pulse 84   Temp 96.8 °F (36 °C) (Temporal)   Ht 167.6 cm (66\")   Wt 53.1 kg (117 lb)   SpO2 99%   BMI 18.88 kg/m²     Physical Exam  Constitutional:       Appearance: She is well-developed.   HENT:      Head: Normocephalic and atraumatic.      Right Ear: Hearing, tympanic membrane and external ear normal.      Left Ear: Hearing, tympanic membrane and external ear normal.      Nose: Nose normal.   Neck:      Thyroid: No thyromegaly.   Cardiovascular:      Rate and Rhythm: Normal rate and regular rhythm.      Heart sounds: Normal heart sounds. No murmur heard.     Pulmonary:      Effort: Pulmonary effort is normal.      Breath sounds: Normal breath sounds.   Chest:      Breasts:         Right: No mass.         Left: No mass.   Abdominal:      General: There is no distension.      Palpations: Abdomen is soft.      Tenderness: There is no abdominal tenderness.   Musculoskeletal:      Cervical back: Neck supple.   Lymphadenopathy:      Cervical: No cervical adenopathy.   Skin:     General: Skin is warm and dry.   Neurological:      Mental Status: She is alert and oriented to person, place, and time.   Psychiatric:         Speech: Speech normal.         Behavior: Behavior normal.         " Thought Content: Thought content normal.         Judgment: Judgment normal.         Assessment/Plan   Diagnoses and all orders for this visit:    1. Well adult exam (Primary)    Other orders  -     Tdap Vaccine Greater Than or Equal To 8yo IM  -     levothyroxine (Synthroid) 75 MCG tablet; Take 1 tablet by mouth Daily.  Dispense: 90 tablet; Refill: 3        Discussion    Patient presents today for a CPE.      Patient follows an adequate diet.   Patient follows an adequate exercise regimen. Mammogram is up to date.   Colon cancer screening is up to date.   Pap smears are performed by the patient's gynecologist.   Immunizations are as per orders.   DEXA is up to date.  I have recommended that the patient get the following immunizations:  Shingrix, tdap, flu and COVID-19.  We discussed diet.  I recommend a diet high in fruits, vegetables, whole grains, lean meats, nuts and beans.  I recommend limiting red meat, full fat dairy, eggs and processed white carbohydrates.  I recommend aerobic exercise at least 3 days per week. I recommend to get 1200 mg of calcium and 1000 IUs of vitamin D through diet and supplements and to participate in a weight based exercise to prevent loss of bone mineral density. Bone mineral will be monitored every two years.          Health Maintenance   Topic Date Due   • ZOSTER VACCINE (2 of 3) 01/08/2016   • PAP SMEAR  05/30/2021   • INFLUENZA VACCINE  09/01/2021   • ANNUAL PHYSICAL  10/01/2021   • MAMMOGRAM  07/01/2022   • LIPID PANEL  09/28/2022   • DXA SCAN  12/17/2022   • COLORECTAL CANCER SCREENING  09/30/2024   • TDAP/TD VACCINES (3 - Td or Tdap) 10/05/2031   • HEPATITIS C SCREENING  Completed   • COVID-19 Vaccine  Completed   • Pneumococcal Vaccine 0-64  Aged Out            Future Appointments   Date Time Provider Department Center   9/28/2022  8:10 AM [facility]   10/4/2022 11:15 AM Saniya Walker MD [facility]

## 2021-10-05 NOTE — PATIENT INSTRUCTIONS
"https://www.nhlbi.nih.gov/files/docs/public/heart/dash_brief.pdf\">   DASH Eating Plan  DASH stands for Dietary Approaches to Stop Hypertension. The DASH eating plan is a healthy eating plan that has been shown to:  · Reduce high blood pressure (hypertension).  · Reduce your risk for type 2 diabetes, heart disease, and stroke.  · Help with weight loss.  What are tips for following this plan?  Reading food labels  · Check food labels for the amount of salt (sodium) per serving. Choose foods with less than 5 percent of the Daily Value of sodium. Generally, foods with less than 300 milligrams (mg) of sodium per serving fit into this eating plan.  · To find whole grains, look for the word \"whole\" as the first word in the ingredient list.  Shopping  · Buy products labeled as \"low-sodium\" or \"no salt added.\"  · Buy fresh foods. Avoid canned foods and pre-made or frozen meals.  Cooking  · Avoid adding salt when cooking. Use salt-free seasonings or herbs instead of table salt or sea salt. Check with your health care provider or pharmacist before using salt substitutes.  · Do not bailon foods. Cook foods using healthy methods such as baking, boiling, grilling, roasting, and broiling instead.  · Cook with heart-healthy oils, such as olive, canola, avocado, soybean, or sunflower oil.  Meal planning    · Eat a balanced diet that includes:  ? 4 or more servings of fruits and 4 or more servings of vegetables each day. Try to fill one-half of your plate with fruits and vegetables.  ? 6-8 servings of whole grains each day.  ? Less than 6 oz (170 g) of lean meat, poultry, or fish each day. A 3-oz (85-g) serving of meat is about the same size as a deck of cards. One egg equals 1 oz (28 g).  ? 2-3 servings of low-fat dairy each day. One serving is 1 cup (237 mL).  ? 1 serving of nuts, seeds, or beans 5 times each week.  ? 2-3 servings of heart-healthy fats. Healthy fats called omega-3 fatty acids are found in foods such as walnuts, " flaxseeds, fortified milks, and eggs. These fats are also found in cold-water fish, such as sardines, salmon, and mackerel.  · Limit how much you eat of:  ? Canned or prepackaged foods.  ? Food that is high in trans fat, such as some fried foods.  ? Food that is high in saturated fat, such as fatty meat.  ? Desserts and other sweets, sugary drinks, and other foods with added sugar.  ? Full-fat dairy products.  · Do not salt foods before eating.  · Do not eat more than 4 egg yolks a week.  · Try to eat at least 2 vegetarian meals a week.  · Eat more home-cooked food and less restaurant, buffet, and fast food.    Lifestyle  · When eating at a restaurant, ask that your food be prepared with less salt or no salt, if possible.  · If you drink alcohol:  ? Limit how much you use to:  § 0-1 drink a day for women who are not pregnant.  § 0-2 drinks a day for men.  ? Be aware of how much alcohol is in your drink. In the [place] one drink equals one 12 oz bottle of beer (355 mL), one 5 oz glass of wine (148 mL), or one 1½ oz glass of hard liquor (44 mL).  General information  · Avoid eating more than 2,300 mg of salt a day. If you have hypertension, you may need to reduce your sodium intake to 1,500 mg a day.  · Work with your health care provider to maintain a healthy body weight or to lose weight. Ask what an ideal weight is for you.  · Get at least 30 minutes of exercise that causes your heart to beat faster (aerobic exercise) most days of the week. Activities may include walking, swimming, or biking.  · Work with your health care provider or dietitian to adjust your eating plan to your individual calorie needs.  What foods should I eat?  Fruits  All fresh, dried, or frozen fruit. Canned fruit in natural juice (without added sugar).  Vegetables  Fresh or frozen vegetables (raw, steamed, roasted, or grilled). Low-sodium or reduced-sodium tomato and vegetable juice. Low-sodium or reduced-sodium tomato sauce and tomato paste.  Low-sodium or reduced-sodium canned vegetables.  Grains  Whole-grain or whole-wheat bread. Whole-grain or whole-wheat pasta. Brown rice. Oatmeal. Quinoa. Bulgur. Whole-grain and low-sodium cereals. Ellen bread. Low-fat, low-sodium crackers. Whole-wheat flour tortillas.  Meats and other proteins  Skinless chicken or turkey. Ground chicken or turkey. Pork with fat trimmed off. Fish and seafood. Egg whites. Dried beans, peas, or lentils. Unsalted nuts, nut butters, and seeds. Unsalted canned beans. Lean cuts of beef with fat trimmed off. Low-sodium, lean precooked or cured meat, such as sausages or meat loaves.  Dairy  Low-fat (1%) or fat-free (skim) milk. Reduced-fat, low-fat, or fat-free cheeses. Nonfat, low-sodium ricotta or cottage cheese. Low-fat or nonfat yogurt. Low-fat, low-sodium cheese.  Fats and oils  Soft margarine without trans fats. Vegetable oil. Reduced-fat, low-fat, or light mayonnaise and salad dressings (reduced-sodium). Canola, safflower, olive, avocado, soybean, and sunflower oils. Avocado.  Seasonings and condiments  Herbs. Spices. Seasoning mixes without salt.  Other foods  Unsalted popcorn and pretzels. Fat-free sweets.  The items listed above may not be a complete list of foods and beverages you can eat. Contact a dietitian for more information.  What foods should I avoid?  Fruits  Canned fruit in a light or heavy syrup. Fried fruit. Fruit in cream or butter sauce.  Vegetables  Creamed or fried vegetables. Vegetables in a cheese sauce. Regular canned vegetables (not low-sodium or reduced-sodium). Regular canned tomato sauce and paste (not low-sodium or reduced-sodium). Regular tomato and vegetable juice (not low-sodium or reduced-sodium). Pickles. Olives.  Grains  Baked goods made with fat, such as croissants, muffins, or some breads. Dry pasta or rice meal packs.  Meats and other proteins  Fatty cuts of meat. Ribs. Fried meat. [place]. Bologna, salami, and other precooked or cured meats, such as  sausages or meat loaves. Fat from the back of a pig (fatback). Bratwurst. Salted nuts and seeds. Canned beans with added salt. Canned or smoked fish. Whole eggs or egg yolks. Chicken or turkey with skin.  Dairy  Whole or 2% milk, cream, and half-and-half. Whole or full-fat cream cheese. Whole-fat or sweetened yogurt. Full-fat cheese. Nondairy creamers. Whipped toppings. Processed cheese and cheese spreads.  Fats and oils  Butter. Stick margarine. Lard. Shortening. Ghee. [place] fat. Tropical oils, such as coconut, palm kernel, or palm oil.  Seasonings and condiments  Onion salt, garlic salt, seasoned salt, table salt, and sea salt. Worcestershire sauce. Tartar sauce. Barbecue sauce. Teriyaki sauce. Soy sauce, including reduced-sodium. Steak sauce. Canned and packaged gravies. Fish sauce. Oyster sauce. Cocktail sauce. Store-bought horseradish. Ketchup. Mustard. Meat flavorings and tenderizers. Bouillon cubes. Hot sauces. Pre-made or packaged marinades. Pre-made or packaged taco seasonings. Relishes. Regular salad dressings.  Other foods  Salted popcorn and pretzels.  The items listed above may not be a complete list of foods and beverages you should avoid. Contact a dietitian for more information.  Where to find more information  · National Heart, Lung, and Blood [place]: www.nhlbi.nih.gov  · American Heart Association: www.heart.org  · Academy of Nutrition and Dietetics: www.eatright.org  · National Kidney Foundation: www.kidney.org  Summary  · The DASH eating plan is a healthy eating plan that has been shown to reduce high blood pressure (hypertension). It may also reduce your risk for type 2 diabetes, heart disease, and stroke.  · When on the DASH eating plan, aim to eat more fresh fruits and vegetables, whole grains, lean proteins, low-fat dairy, and heart-healthy fats.  · With the DASH eating plan, you should limit salt (sodium) intake to 2,300 mg a day. If you have hypertension, you may need to reduce your  sodium intake to 1,500 mg a day.  · Work with your health care provider or dietitian to adjust your eating plan to your individual calorie needs.  This information is not intended to replace advice given to you by your health care provider. Make sure you discuss any questions you have with your health care provider.  Document Revised: 11/20/2020 Document Reviewed: 11/20/2020  [employer] Patient Education © 2021 Elsevier Inc.

## 2021-10-14 RX ORDER — LEVOTHYROXINE SODIUM 0.07 MG/1
75 TABLET ORAL DAILY
Qty: 90 TABLET | Refills: 3 | Status: SHIPPED | OUTPATIENT
Start: 2021-10-14 | End: 2022-10-24

## 2021-11-03 ENCOUNTER — FLU SHOT (OUTPATIENT)
Dept: INTERNAL MEDICINE | Facility: CLINIC | Age: 66
End: 2021-11-03

## 2022-03-23 ENCOUNTER — OFFICE VISIT (OUTPATIENT)
Dept: SURGERY | Facility: CLINIC | Age: 67
End: 2022-03-23

## 2022-03-23 VITALS — BODY MASS INDEX: 18.96 KG/M2 | HEIGHT: 66 IN | WEIGHT: 118 LBS

## 2022-03-23 DIAGNOSIS — R14.0 ABDOMINAL BLOATING: ICD-10-CM

## 2022-03-23 DIAGNOSIS — R13.19 ESOPHAGEAL DYSPHAGIA: Primary | ICD-10-CM

## 2022-03-23 DIAGNOSIS — R19.00 ABDOMINAL PULSATILE MASS: ICD-10-CM

## 2022-03-23 PROCEDURE — 99214 OFFICE O/P EST MOD 30 MIN: CPT | Performed by: SURGERY

## 2022-03-24 NOTE — PROGRESS NOTES
CC: Abdominal discomfort     HPI: Patient is a very pleasant 67-year-old female that was referred by Dr. Walker for evaluation of abdominal discomfort.  The patient reports having history of tongue cancer status post radiation.  She used to have severe dysphagia and was for a long time on a puréed diet.  That eventually improved and she has been able to tolerate regular diet.  In 2018 she had an episode where she felt that after eating food got stuck in her abdomen in the right lower abdomen.  She needed to move around and walk and eventually felt that obstruction resolved.  This happened after eating Shawarma.  She never have similar episodes like that until 2 years after when she tried again the same food and felt same feeling.  These resolved by itself.  On December 15 she ate raw carrots after several minutes she felt similar symptoms of food getting stuck in the right lower abdomen.  She tried to drink water but threw up the water.  At that time she was in a restaurant so she left the place and walking the parking lot.  She started jumping and eventually felt that the obstruction was relieved.  She has not have any more symptoms like this.  She is here today for further evaluation.  She denies any recent weight loss.  She has intermittent episodes of dysphagia with solids but not to liquids.  She reports no change in bowel habits     PMH: Hyperlipidemia, hypothyroidism, colon polyp, tongue carcinoma, osteopenia, eczema     PSH:  -Tongue surgery  -Eye surgery  -Right breast mass excision  -Bilateral breast biopsy  -Craniotomy 1995  -Nasal septum repair 2005  -Bilateral breast cyst aspiration  -Colonoscopy 2018     MEDS: Levothyroxine     ALL: Cephalexin, Bactrim, hydrocortisone and triple antibiotic ointment    FH and SH: Family history is noncontributory.  Denies smoking drinking or taking any drugs     ROS:   Constitutional: denies any weight changes, fatigue or weakness.  HEENT: Denies hearing loss and  "rhinorrhea  Cardiovascular: denies chest pain, palpitations, edemas.  Respiratory: denies cough, sputum, SOB.  Gastrointestinal: denies N&V, abd pain, diarrhea, constipation.  Reports dysphagia  Genitourinary: denies dysuria, frequency.  Endocrine: denies cold intolerance, lethargy and flushing.  Hem: denies excessive bruising and postop bleeding.  Musculoskeletal: denies weakness, joint swelling, pain or stiffness.  Neuro: denies seizures, CVA, paresthesia, or peripheral neuropathy.   Skin: denies change in nevi, rashes, masses.     PE:   Vitals:    03/23/22 0923   Weight: 53.5 kg (118 lb)   Height: 167.6 cm (66\")     Body mass index is 19.05 kg/m².   Alert and oriented ×3, no acute distress.  Head is normocephalic and atraumatic.  Neck is supple there is no thyromegaly or lymphadenopathy  Chest is clear bilaterally there is no added sounds  Regular rate and rhythm, no murmurs  Abdomen is soft and nontender, is nondistended, bowel sounds are positive.  There is no rebound or guarding is and there is no peritoneal signs.  Palpable pulsatile mass within the middle of the abdomen.  No clubbing cyanosis or edema in lower or upper extremities    Diagnostic studies:   No pertinent imaging studies     Assessment and plan    The patient is a very pleasant 67-year-old female with abdominal complaints that are not very specific and not really consistent with any disease.  The symptoms may have been related to gas going through the GI tract.  She has history of oropharyngeal dysphagia.  Her aorta can be palpated in the middle of the abdomen.  She is really skinny and difficult to say if there is aneurysmatic dilation.    Discussed with patient about further step.  I think she should undergo upper GI small bowel follow-through to assess for any GI tract abnormality including stricture or hernias.  I recommend that she undergoes ultrasound abdomen to assess for aortic aneurysm    Patient verbalized understanding and agreed with " the plan.  We will call the patient with the results     Sourav Romo MD  General, Minimally Invasive and Endoscopic Surgery  [facility]     [address], [address]  [place], [place], [address]  P: [phone]  F: [phone]

## 2022-04-19 ENCOUNTER — HOSPITAL ENCOUNTER (OUTPATIENT)
Dept: ULTRASOUND IMAGING | Facility: HOSPITAL | Age: 67
Discharge: HOME | End: 2022-04-19
Admitting: SURGERY

## 2022-04-19 DIAGNOSIS — R14.0 ABDOMINAL BLOATING: ICD-10-CM

## 2022-04-19 DIAGNOSIS — R19.00 ABDOMINAL PULSATILE MASS: ICD-10-CM

## 2022-04-19 PROCEDURE — 76706 US ABDL AORTA SCREEN AAA: CPT

## 2022-04-20 ENCOUNTER — TELEPHONE (OUTPATIENT)
Dept: SURGERY | Facility: CLINIC | Age: 67
End: 2022-04-20

## 2022-04-20 NOTE — TELEPHONE ENCOUNTER
----- Message from Sourav Romo MD sent at 4/19/2022  9:22 AM EDT -----  Please call the patient regarding her result. Normal,  no need for for further workup for her aorta

## 2022-05-13 ENCOUNTER — TELEPHONE (OUTPATIENT)
Dept: SURGERY | Facility: CLINIC | Age: 67
End: 2022-05-13

## 2022-05-13 NOTE — TELEPHONE ENCOUNTER
Pt calling about [place] upper GI single contrast. Is there another option? They ran threw insurance and it is going to be an out of pocket of $2000.

## 2022-05-31 ENCOUNTER — HOSPITAL ENCOUNTER (OUTPATIENT)
Dept: GENERAL RADIOLOGY | Facility: HOSPITAL | Age: 67
Discharge: HOME | End: 2022-05-31
Admitting: SURGERY

## 2022-05-31 DIAGNOSIS — R13.19 ESOPHAGEAL DYSPHAGIA: ICD-10-CM

## 2022-05-31 DIAGNOSIS — R14.0 ABDOMINAL BLOATING: ICD-10-CM

## 2022-05-31 PROCEDURE — 74248 X-RAY SM INT F-THRU STD: CPT

## 2022-05-31 PROCEDURE — 74246 X-RAY XM UPR GI TRC 2CNTRST: CPT

## 2022-05-31 RX ADMIN — ANTACID/ANTIFLATULENT 1 PACKET: 380; 550; 10; 10 GRANULE, EFFERVESCENT ORAL at 08:18

## 2022-05-31 RX ADMIN — BARIUM SULFATE 60 ML: 980 POWDER, FOR SUSPENSION ORAL at 08:20

## 2022-05-31 RX ADMIN — BARIUM SULFATE 366 ML: 960 POWDER, FOR SUSPENSION ORAL at 08:22

## 2022-06-09 ENCOUNTER — OFFICE VISIT (OUTPATIENT)
Dept: INTERNAL MEDICINE | Facility: CLINIC | Age: 67
End: 2022-06-09

## 2022-06-09 VITALS
SYSTOLIC BLOOD PRESSURE: 122 MMHG | RESPIRATION RATE: 16 BRPM | WEIGHT: 112.43 LBS | DIASTOLIC BLOOD PRESSURE: 60 MMHG | HEIGHT: 66 IN | HEART RATE: 78 BPM | BODY MASS INDEX: 18.07 KG/M2

## 2022-06-09 DIAGNOSIS — S70.362A INSECT BITE OF LEFT THIGH, INITIAL ENCOUNTER: Primary | ICD-10-CM
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
## 2022-06-09 PROCEDURE — 99212 OFFICE O/P EST SF 10 MIN: CPT | Performed by: NURSE PRACTITIONER

## 2022-06-09 NOTE — PROGRESS NOTES
Subjective   Joanne Martin is a 67 y.o. female.   Chief Complaint   Patient presents with   • Tick Removal     Vitals:    06/09/22 0825   BP: 122/60   Pulse: 78   Resp: 16     No LMP recorded. Patient is postmenopausal.    History of Present Illness  Joanne is a 67 year old female patient of Dr Walker who is here for an acute visit. She c/o insect bite to the left upper thigh that she noticed Tuesday. She went hiking with her  and was in the woods. She noticed a red abner on her leg and thinks it may be due to a tick . She did not remove a tick. She denies fever, chills, or body aches. The area does not hurt or itch     The following portions of the patient's history were reviewed and updated as appropriate: allergies, current medications, past family history, past medical history, past social history, past surgical history and problem list.    Review of Systems   Constitutional: Negative for chills, fatigue and fever.   Musculoskeletal: Negative for arthralgias.   Skin: Positive for rash.       Objective   Physical Exam  Vitals and nursing note reviewed.   Constitutional:       General: She is not in acute distress.     Appearance: Normal appearance. She is well-developed and well-groomed.   Cardiovascular:      Rate and Rhythm: Normal rate.   Pulmonary:      Effort: Pulmonary effort is normal.   Skin:     Comments: There is an erythematous papule to the right upper thigh , there is no bullseye rash noted, no pustule or drainage    Neurological:      Mental Status: She is alert.         Assessment & Plan   Diagnoses and all orders for this visit:    1. Insect bite of left thigh, initial encounter (Primary)        Monitor the area,   Monitor for bullseye rash  Can apply hydrocortisone if needed  Follow up if symptoms worsen or new symptoms develop

## 2022-06-10 ENCOUNTER — TELEPHONE (OUTPATIENT)
Dept: SURGERY | Facility: CLINIC | Age: 67
End: 2022-06-10

## 2022-06-10 DIAGNOSIS — R13.12 OROPHARYNGEAL DYSPHAGIA: Primary | ICD-10-CM

## 2022-06-10 DIAGNOSIS — R93.3 ABNORMAL ESOPHAGRAM: ICD-10-CM

## 2022-06-10 NOTE — TELEPHONE ENCOUNTER
I called the patient and discussed the finding of her upper GI.  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  She does not have any other esophageal abnormality.  Her stomach and small bowel are normal.  She has been mildly symptomatic and reports only intermittent episodes of dysphagia.  Her chronic dysphagia has been getting better throughout the years.  She is able to tolerate diet with minimal dysphagia.  She has intermittent episodes of what seems to be esophageal spasm.  She never had upper endoscopy.  Discussed with the patient about further step.  In order for her to be definitely diagnosed with critical pharyngeal achalasia will need to have an esophageal manometry.  If symptoms continues to get worse then there are treatment option for critical pharyngeal achalasia if this is confirmed by manometry.  She does not want to pursue further diagnostic studies for this since symptoms are well controlled with dietary changes.  I recommend that she undergoes upper endoscopy for evaluation of the esophagus to rule out pseudo achalasia, acid reflux, esophagitis, [place]'s esophagus  Risk and benefits of procedure including bleeding, infection, perforation discussed in detail with the patient that verbalized understanding and agree with the plan

## 2022-06-13 PROBLEM — R13.12 OROPHARYNGEAL DYSPHAGIA: Status: ACTIVE | Noted: 2022-06-13

## 2022-06-13 PROBLEM — R93.3 ABNORMAL ESOPHAGRAM: Status: ACTIVE | Noted: 2022-06-13

## 2022-06-23 DIAGNOSIS — Z01.818 PREOP TESTING: Primary | ICD-10-CM

## 2022-06-27 ENCOUNTER — LAB (OUTPATIENT)
Dept: LAB | Facility: HOSPITAL | Age: 67
End: 2022-06-27

## 2022-06-27 DIAGNOSIS — Z01.818 PREOP TESTING: ICD-10-CM

## 2022-06-27 LAB — SARS-COV-2 ORF1AB RESP QL NAA+PROBE: NOT DETECTED

## 2022-06-27 PROCEDURE — U0004 COV-19 TEST NON-CDC HGH THRU: HCPCS

## 2022-06-27 PROCEDURE — C9803 HOPD COVID-19 SPEC COLLECT: HCPCS

## 2022-06-28 ENCOUNTER — ANESTHESIA (OUTPATIENT)
Dept: GASTROENTEROLOGY | Facility: HOSPITAL | Age: 67
End: 2022-06-28

## 2022-06-28 ENCOUNTER — HOSPITAL ENCOUNTER (OUTPATIENT)
Facility: HOSPITAL | Age: 67
Setting detail: HOSPITAL OUTPATIENT SURGERY
Discharge: HOME | End: 2022-06-28
Attending: SURGERY | Admitting: SURGERY

## 2022-06-28 ENCOUNTER — ANESTHESIA EVENT (OUTPATIENT)
Dept: GASTROENTEROLOGY | Facility: HOSPITAL | Age: 67
End: 2022-06-28

## 2022-06-28 VITALS
BODY MASS INDEX: 18.98 KG/M2 | HEART RATE: 85 BPM | HEIGHT: 66 IN | SYSTOLIC BLOOD PRESSURE: 130 MMHG | RESPIRATION RATE: 16 BRPM | OXYGEN SATURATION: 99 % | DIASTOLIC BLOOD PRESSURE: 89 MMHG | WEIGHT: 118.1 LBS

## 2022-06-28 DIAGNOSIS — R13.12 OROPHARYNGEAL DYSPHAGIA: ICD-10-CM

## 2022-06-28 DIAGNOSIS — R93.3 ABNORMAL ESOPHAGRAM: ICD-10-CM

## 2022-06-28 PROCEDURE — 25010000002 PROPOFOL 10 MG/ML EMULSION: Performed by: ANESTHESIOLOGY

## 2022-06-28 PROCEDURE — 88305 TISSUE EXAM BY PATHOLOGIST: CPT | Performed by: SURGERY

## 2022-06-28 PROCEDURE — S0260 H&P FOR SURGERY: HCPCS | Performed by: SURGERY

## 2022-06-28 PROCEDURE — 43239 EGD BIOPSY SINGLE/MULTIPLE: CPT | Performed by: SURGERY

## 2022-06-28 RX ORDER — SODIUM CHLORIDE, SODIUM LACTATE, POTASSIUM CHLORIDE, CALCIUM CHLORIDE 600; 310; 30; 20 MG/100ML; MG/100ML; MG/100ML; MG/100ML
30 INJECTION, SOLUTION INTRAVENOUS CONTINUOUS PRN
Status: DISCONTINUED | OUTPATIENT
Start: 2022-06-28 | End: 2022-06-28 | Stop reason: HOSPADM

## 2022-06-28 RX ORDER — SODIUM CHLORIDE 0.9 % (FLUSH) 0.9 %
10 SYRINGE (ML) INJECTION AS NEEDED
Status: DISCONTINUED | OUTPATIENT
Start: 2022-06-28 | End: 2022-06-28 | Stop reason: HOSPADM

## 2022-06-28 RX ORDER — ASPIRIN 325 MG
325 TABLET ORAL DAILY
COMMUNITY
End: 2023-01-04

## 2022-06-28 RX ORDER — LIDOCAINE HYDROCHLORIDE 20 MG/ML
INJECTION, SOLUTION INFILTRATION; PERINEURAL AS NEEDED
Status: DISCONTINUED | OUTPATIENT
Start: 2022-06-28 | End: 2022-06-28 | Stop reason: SURG

## 2022-06-28 RX ORDER — PROPOFOL 10 MG/ML
VIAL (ML) INTRAVENOUS AS NEEDED
Status: DISCONTINUED | OUTPATIENT
Start: 2022-06-28 | End: 2022-06-28 | Stop reason: SURG

## 2022-06-28 RX ORDER — SODIUM CHLORIDE 0.9 % (FLUSH) 0.9 %
10 SYRINGE (ML) INJECTION EVERY 12 HOURS SCHEDULED
Status: DISCONTINUED | OUTPATIENT
Start: 2022-06-28 | End: 2022-06-28 | Stop reason: HOSPADM

## 2022-06-28 RX ORDER — GLYCOPYRROLATE 0.2 MG/ML
INJECTION INTRAMUSCULAR; INTRAVENOUS AS NEEDED
Status: DISCONTINUED | OUTPATIENT
Start: 2022-06-28 | End: 2022-06-28 | Stop reason: SURG

## 2022-06-28 RX ORDER — OMEPRAZOLE 40 MG/1
40 CAPSULE, DELAYED RELEASE ORAL DAILY
Qty: 60 CAPSULE | Refills: 0 | Status: SHIPPED | OUTPATIENT
Start: 2022-06-28 | End: 2023-01-04

## 2022-06-28 RX ORDER — PROPOFOL 10 MG/ML
VIAL (ML) INTRAVENOUS CONTINUOUS PRN
Status: DISCONTINUED | OUTPATIENT
Start: 2022-06-28 | End: 2022-06-28 | Stop reason: SURG

## 2022-06-28 RX ADMIN — PROPOFOL 100 MG: 10 INJECTION, EMULSION INTRAVENOUS at 11:35

## 2022-06-28 RX ADMIN — Medication 200 MCG/KG/MIN: at 11:35

## 2022-06-28 RX ADMIN — SODIUM CHLORIDE, POTASSIUM CHLORIDE, SODIUM LACTATE AND CALCIUM CHLORIDE 30 ML/HR: 600; 310; 30; 20 INJECTION, SOLUTION INTRAVENOUS at 10:38

## 2022-06-28 RX ADMIN — LIDOCAINE HYDROCHLORIDE 80 MG: 20 INJECTION, SOLUTION INFILTRATION; PERINEURAL at 11:35

## 2022-06-28 RX ADMIN — GLYCOPYRROLATE 0.2 MG: 0.2 INJECTION INTRAMUSCULAR; INTRAVENOUS at 11:34

## 2022-06-28 NOTE — ANESTHESIA POSTPROCEDURE EVALUATION
"Patient: Joanne Martin    Procedure Summary     Date: 06/28/22 Room / Location:  [facility]    Anesthesia Start: 1129 Anesthesia Stop: 1148    Procedure: ESOPHAGOGASTRODUODENOSCOPY WITH COLD BIOPSIES (N/A Esophagus) Diagnosis:       Oropharyngeal dysphagia      Abnormal esophagram      (Oropharyngeal dysphagia [R13.12])      (Abnormal esophagram [R93.3])    Surgeons: Sourav Romo MD Provider: Facundo Vizcarra MD    Anesthesia Type: MAC ASA Status: 3          Anesthesia Type: MAC    Vitals  Vitals Value Taken Time   /89 06/28/22 1209   Temp     Pulse 85 06/28/22 1209   Resp 16 06/28/22 1209   SpO2 99 % 06/28/22 1209           Post Anesthesia Care and Evaluation    Patient location during evaluation: bedside  Patient participation: complete - patient participated  Level of consciousness: awake and alert  Pain management: adequate    Airway patency: patent  Anesthetic complications: No anesthetic complications    Cardiovascular status: acceptable  Respiratory status: acceptable  Hydration status: acceptable    Comments: /89 (BP Location: Left arm, Patient Position: Lying)   Pulse 85   Resp 16   Ht 167.6 cm (66\")   Wt 53.6 kg (118 lb 1.6 oz)   SpO2 99%   BMI 19.06 kg/m²       "

## 2022-06-28 NOTE — ANESTHESIA PREPROCEDURE EVALUATION
Anesthesia Evaluation     Patient summary reviewed and Nursing notes reviewed   no history of anesthetic complications:  NPO Solid Status: > 8 hours  NPO Liquid Status: > 8 hours           Airway   Mallampati: II  Dental      Pulmonary - negative pulmonary ROS and normal exam   Cardiovascular - normal exam    (+) hyperlipidemia,       Neuro/Psych- negative ROS  GI/Hepatic/Renal/Endo    (+)   thyroid problem hypothyroidism    Musculoskeletal     Abdominal    Substance History      OB/GYN          Other      history of cancer remission                    Anesthesia Plan    ASA 3     MAC     intravenous induction     Anesthetic plan, risks, benefits, and alternatives have been provided, discussed and informed consent has been obtained with: patient.        CODE STATUS:

## 2022-06-28 NOTE — H&P
Subjective:   Reason for Visit: Dysphagia    S: 67-year-old female with longstanding history of dysphagia.  She recently underwent upper GI that show evidence of cricopharyngeal achalasia.  Reports heartburn, no regurgitation.    Past Medical History:   Diagnosis Date   • Carcinoma of tongue (HCC)    • Colon polyp 2009   • History of eczema    • History of fracture of clavicle    • Hypothyroidism        Past Surgical History:   Procedure Laterality Date   • BREAST BIOPSY Bilateral    • BREAST CYST ASPIRATION Bilateral 2009   • BREAST MASS EXCISION Right    • COLONOSCOPY N/A 09/30/2021    Procedure: COLONOSCOPY TO CECUM/TI WITH POLYPECTOMY ( HOT SNARE/ COLD BX) SALINE LIFT;  Surgeon: Sourav Romo MD;  Location: [facility] ENDOSCOPY;  Service: General;  Laterality: N/A;  HX OF COLON POLYP; FAM HX OF COLON CANCER  POST: COLON POLYP   • EYE SURGERY     • NASAL SEPTUM SURGERY  2005    deviation repair   • TONGUE SURGERY           Current Facility-Administered Medications:   •  lactated ringers infusion, 30 mL/hr, Intravenous, Continuous PRN, Sourav Romo MD, Last Rate: 30 mL/hr at 06/28/22 1129, Currently Infusing at 06/28/22 1129  •  sodium chloride 0.9 % flush 10 mL, 10 mL, Intravenous, Q12H, Sourav Romo MD  •  sodium chloride 0.9 % flush 10 mL, 10 mL, Intravenous, PRN, Sourav Romo MD    Allergies   Allergen Reactions   • Cephalexin GI Intolerance   • Hydrocortisone Rash   • Latex Rash   • Neomycin-Bacitracin Zn-Polymyx Rash       Family History   Problem Relation Age of Onset   • Macular degeneration Mother    • Colon cancer Father    • Melanoma Sister    • Macular degeneration Maternal Grandmother    • Thyroid disease Paternal Grandmother    • Coronary artery disease Other    • Malig Hyperthermia Neg Hx        Social History     Socioeconomic History   • Marital status: Single   Tobacco Use   • Smoking status: Never Smoker   • Smokeless tobacco: Never Used   Vaping  "Use   • Vaping Use: Never used   Substance and Sexual Activity   • Alcohol use: No   • Drug use: No   • Sexual activity: Not Currently     Partners: Male        Review Of Systems:  A comprehensive review of systems was negative except for: Dysphagia    Objective:   Physical exam:  /100 (BP Location: Left arm, Patient Position: Sitting)   Pulse 96   Resp 22   Ht 167.6 cm (66\")   Wt 53.6 kg (118 lb 1.6 oz)   SpO2 98%   BMI 19.06 kg/m²     General Appearance:  awake, alert, oriented, in no acute distress and well developed, well nourished  Lungs:  Normal expansion.  Clear to auscultation.  No rales, rhonchi, or wheezing.  Heart:  Heart sounds are normal.  Regular rate and rhythm without murmur, gallop or rub.  Abdomen:  Soft, non-tender, normal bowel sounds; no bruits, organomegaly or masses.    Diagnostic studies:   Upper GI 5/31/33:   CONCLUSION:  1. Cricopharyngeal achalasia. There is undercoating of the epiglottis  without deep penetration or aspiration.  2. Stomach duodenum and small bowel have a normal appearance, transit  time between 60 and 90 minutes.    Assessment:    Joanne Martin is a 67 y.o. female who presents for evaluation for her dysphagia.  She has findings of cricopharyngeal achalasia on upper GI.  Discussed with the patient about further step.  I think she should undergo upper endoscopy for evaluation of stool collection, GERD.  Risk and benefits of procedure including bleeding, infection, perforation discussed in detail with the patient that verbalized understanding and agreed with the plan      "

## 2022-06-29 LAB
LAB AP CASE REPORT: NORMAL
LAB AP DIAGNOSIS COMMENT: NORMAL
PATH REPORT.FINAL DX SPEC: NORMAL
PATH REPORT.GROSS SPEC: NORMAL

## 2022-07-01 ENCOUNTER — TELEPHONE (OUTPATIENT)
Dept: SURGERY | Facility: CLINIC | Age: 67
End: 2022-07-01

## 2022-07-05 ENCOUNTER — TELEPHONE (OUTPATIENT)
Dept: SURGERY | Facility: CLINIC | Age: 67
End: 2022-07-05

## 2022-07-06 ENCOUNTER — APPOINTMENT (OUTPATIENT)
Dept: WOMENS IMAGING | Facility: HOSPITAL | Age: 67
End: 2022-07-06

## 2022-07-06 PROCEDURE — 77067 SCR MAMMO BI INCL CAD: CPT | Performed by: RADIOLOGY

## 2022-07-06 PROCEDURE — 77063 BREAST TOMOSYNTHESIS BI: CPT | Performed by: RADIOLOGY

## 2022-07-07 ENCOUNTER — TELEPHONE (OUTPATIENT)
Dept: SURGERY | Facility: CLINIC | Age: 67
End: 2022-07-07

## 2022-07-07 NOTE — TELEPHONE ENCOUNTER
I called and spoke with Joanne Martin regarding her upper endoscopy results.     Pathology:   Final Diagnosis   1. Pre-pylorus, Biopsy:               A. Gastric antral mucosa with chronic inactive gastritis and reactive foveolar hyperplasia.               B. Negative for intestinal metaplasia.               C. Negative for H. pylori-type organisms on routine stain.     2. Stomach, Body, Biopsy:               A. Gastric antral and oxyntic mucosa chronic inactive gastritis.               B. Negative for intestinal metaplasia.               C. Negative for H. pylori-type organisms on routine stain.     3. Gastroesophageal Junction, Biopsy:                A. Squamous mucosa with spongiosis and increased intraepithelial eosinophils (up to >75/HPF);                     see comment.   B. Negative for intestinal metaplasia.     4. Esophagus, Mid, Biopsy:               A. Squamous mucosa with spongiosis and increased intraepithelial eosinophils (up to 5/HPF);                     see comment.     jab/pkm                   Electronically signed by Quin Jovel MD on 6/29/2022 at 1401   Comment    There is a significant eosinophilia within the gastroesophageal junction. Although the number of eosinophils seen in the mid esophageal biopsy are somewhat low, the disease process can still be patchy and these findings are highly suspicious for eosinophilic esophagitis particularly in the setting of dysphagia. Clinical and endoscopic correlation is recommended.         I discussed about her symptoms and the fact that eosinophilic esophagitis goes with the symptoms that she was having.  Discussed with her about the need to start with omeprazole 40 mg daily for 2 months.  We will reevaluate in 2 months and decide whether we need to add topical steroids    She verbalized understanding and agreed    Sourav Romo MD  General, Minimally Invasive and Endoscopic Surgery  [facility]    [address]  [place], [place], [address]  P: [phone]  F: [phone]

## 2022-08-19 ENCOUNTER — IMMUNIZATION (OUTPATIENT)
Dept: VACCINE CLINIC | Facility: HOSPITAL | Age: 67
End: 2022-08-19

## 2022-08-19 DIAGNOSIS — Z23 NEED FOR VACCINATION: Primary | ICD-10-CM

## 2022-08-19 PROCEDURE — 0054A HC ADM SARSCV2 30MCG TRS-SUCR BOOSTER: CPT | Performed by: INTERNAL MEDICINE

## 2022-08-19 PROCEDURE — 91305 HC SARSCOV2 VAC 30 MCG TRS-SUCR PFIZER: CPT | Performed by: INTERNAL MEDICINE

## 2022-10-24 RX ORDER — LEVOTHYROXINE SODIUM 0.07 MG/1
75 TABLET ORAL DAILY
Qty: 90 TABLET | Refills: 3 | Status: SHIPPED | OUTPATIENT
Start: 2022-10-24

## 2022-12-27 DIAGNOSIS — E03.9 ACQUIRED HYPOTHYROIDISM: ICD-10-CM

## 2022-12-27 DIAGNOSIS — E78.5 HYPERLIPIDEMIA, UNSPECIFIED HYPERLIPIDEMIA TYPE: Primary | ICD-10-CM

## 2022-12-30 LAB
ALBUMIN SERPL-MCNC: 4 G/DL (ref 3.5–5.2)
ALBUMIN/GLOB SERPL: 1.7 G/DL
ALP SERPL-CCNC: 93 U/L (ref 39–117)
ALT SERPL-CCNC: 19 U/L (ref 1–33)
APPEARANCE UR: CLEAR
AST SERPL-CCNC: 23 U/L (ref 1–32)
BACTERIA #/AREA URNS HPF: NORMAL /HPF
BASOPHILS # BLD AUTO: 0.08 10*3/MM3 (ref 0–0.2)
BASOPHILS NFR BLD AUTO: 1.3 % (ref 0–1.5)
BILIRUB SERPL-MCNC: 0.3 MG/DL (ref 0–1.2)
BILIRUB UR QL STRIP: NEGATIVE
BUN SERPL-MCNC: 17 MG/DL (ref 8–23)
BUN/CREAT SERPL: 21 (ref 7–25)
CALCIUM SERPL-MCNC: 9.3 MG/DL (ref 8.6–10.5)
CASTS URNS MICRO: NORMAL
CHLORIDE SERPL-SCNC: 98 MMOL/L (ref 98–107)
CHOLEST SERPL-MCNC: 260 MG/DL (ref 0–200)
CO2 SERPL-SCNC: 28.3 MMOL/L (ref 22–29)
COLOR UR: YELLOW
CREAT SERPL-MCNC: 0.81 MG/DL (ref 0.57–1)
EGFRCR SERPLBLD CKD-EPI 2021: 80.7 ML/MIN/1.73
EOSINOPHIL # BLD AUTO: 0.59 10*3/MM3 (ref 0–0.4)
EOSINOPHIL NFR BLD AUTO: 9.8 % (ref 0.3–6.2)
EPI CELLS #/AREA URNS HPF: NORMAL /HPF
ERYTHROCYTE [DISTWIDTH] IN BLOOD BY AUTOMATED COUNT: 13 % (ref 12.3–15.4)
GLOBULIN SER CALC-MCNC: 2.4 GM/DL
GLUCOSE SERPL-MCNC: 91 MG/DL (ref 65–99)
GLUCOSE UR QL STRIP: NEGATIVE
HCT VFR BLD AUTO: 40 % (ref 34–46.6)
HDLC SERPL-MCNC: 104 MG/DL (ref 40–60)
HGB BLD-MCNC: 13.4 G/DL (ref 12–15.9)
HGB UR QL STRIP: NEGATIVE
IMM GRANULOCYTES # BLD AUTO: 0.01 10*3/MM3 (ref 0–0.05)
IMM GRANULOCYTES NFR BLD AUTO: 0.2 % (ref 0–0.5)
KETONES UR QL STRIP: NEGATIVE
LDLC SERPL CALC-MCNC: 147 MG/DL (ref 0–100)
LEUKOCYTE ESTERASE UR QL STRIP: NEGATIVE
LYMPHOCYTES # BLD AUTO: 2.16 10*3/MM3 (ref 0.7–3.1)
LYMPHOCYTES NFR BLD AUTO: 36.1 % (ref 19.6–45.3)
MCH RBC QN AUTO: 31 PG (ref 26.6–33)
MCHC RBC AUTO-ENTMCNC: 33.5 G/DL (ref 31.5–35.7)
MCV RBC AUTO: 92.6 FL (ref 79–97)
MONOCYTES # BLD AUTO: 0.61 10*3/MM3 (ref 0.1–0.9)
MONOCYTES NFR BLD AUTO: 10.2 % (ref 5–12)
NEUTROPHILS # BLD AUTO: 2.54 10*3/MM3 (ref 1.7–7)
NEUTROPHILS NFR BLD AUTO: 42.4 % (ref 42.7–76)
NITRITE UR QL STRIP: NEGATIVE
NRBC BLD AUTO-RTO: 0 /100 WBC (ref 0–0.2)
PH UR STRIP: 7 [PH] (ref 5–8)
PLATELET # BLD AUTO: 242 10*3/MM3 (ref 140–450)
POTASSIUM SERPL-SCNC: 3.8 MMOL/L (ref 3.5–5.2)
PROT SERPL-MCNC: 6.4 G/DL (ref 6–8.5)
PROT UR QL STRIP: NEGATIVE
RBC # BLD AUTO: 4.32 10*6/MM3 (ref 3.77–5.28)
RBC #/AREA URNS HPF: NORMAL /HPF
SODIUM SERPL-SCNC: 137 MMOL/L (ref 136–145)
SP GR UR STRIP: 1.01 (ref 1–1.03)
TRIGL SERPL-MCNC: 57 MG/DL (ref 0–150)
TSH SERPL DL<=0.005 MIU/L-ACNC: 2.31 UIU/ML (ref 0.27–4.2)
UROBILINOGEN UR STRIP-MCNC: NORMAL MG/DL
VLDLC SERPL CALC-MCNC: 9 MG/DL (ref 5–40)
WBC # BLD AUTO: 5.99 10*3/MM3 (ref 3.4–10.8)
WBC #/AREA URNS HPF: NORMAL /HPF

## 2023-01-04 ENCOUNTER — OFFICE VISIT (OUTPATIENT)
Dept: INTERNAL MEDICINE | Facility: CLINIC | Age: 68
End: 2023-01-04
Payer: COMMERCIAL

## 2023-01-04 VITALS
BODY MASS INDEX: 18.32 KG/M2 | WEIGHT: 114 LBS | SYSTOLIC BLOOD PRESSURE: 130 MMHG | OXYGEN SATURATION: 97 % | HEART RATE: 73 BPM | DIASTOLIC BLOOD PRESSURE: 80 MMHG | HEIGHT: 66 IN

## 2023-01-04 DIAGNOSIS — Z13.820 SCREENING FOR OSTEOPOROSIS: ICD-10-CM

## 2023-01-04 DIAGNOSIS — Z00.00 WELL ADULT EXAM: Primary | ICD-10-CM

## 2023-01-04 DIAGNOSIS — Z78.0 MENOPAUSE: ICD-10-CM

## 2023-01-04 PROCEDURE — 90677 PCV20 VACCINE IM: CPT | Performed by: INTERNAL MEDICINE

## 2023-01-04 PROCEDURE — 90686 IIV4 VACC NO PRSV 0.5 ML IM: CPT | Performed by: INTERNAL MEDICINE

## 2023-01-04 PROCEDURE — 90472 IMMUNIZATION ADMIN EACH ADD: CPT | Performed by: INTERNAL MEDICINE

## 2023-01-04 PROCEDURE — 90471 IMMUNIZATION ADMIN: CPT | Performed by: INTERNAL MEDICINE

## 2023-01-04 PROCEDURE — 99397 PER PM REEVAL EST PAT 65+ YR: CPT | Performed by: INTERNAL MEDICINE

## 2023-01-04 NOTE — PROGRESS NOTES
Subjective     Joanne Martin is a 67 y.o. female who presents for a complete physical exam.      History of Present Illness     The following data was reviewed by: Saniya Walker MD on 01/04/2023:  Common labs    Common Labs 12/30/22 12/30/22 12/30/22    0828 0828 0828   Glucose  91    BUN  17    Creatinine  0.81    Sodium  137    Potassium  3.8    Chloride  98    Calcium  9.3    Total Protein  6.4    Albumin  4.0    Total Bilirubin  0.3    Alkaline Phosphatase  93    AST (SGOT)  23    ALT (SGPT)  19    WBC 5.99     Hemoglobin 13.4     Hematocrit 40.0     Platelets 242     Total Cholesterol   260 (A)   Triglycerides   57   HDL Cholesterol   104 (A)   LDL Cholesterol    147 (A)   (A) Abnormal value       Comments are available for some flowsheets but are not being displayed.           HLD.  I calculated the patient's 10-year risk of heart disease or stroke using the ASCVD algorithm published by the ACC/AHA.  The patient 10-year risk is calculated at 5.1%.     Hypothyroidism.  She is well controlled.     Review of Systems   Respiratory: Negative.    Cardiovascular: Negative.        The following portions of the patient's history were reviewed and updated as appropriate: allergies, current medications, past family history, past medical history, past social history, past surgical history and problem list.  Health maintenance tab was reviewed and updated with the patient.       Patient Active Problem List    Diagnosis Date Noted   • Oropharyngeal dysphagia 06/13/2022     Note Last Updated: 6/13/2022     Added automatically from request for surgery [id number]     • Abnormal esophagram 06/13/2022     Note Last Updated: 6/13/2022     Added automatically from request for surgery [id number]     • History of adenomatous polyp of colon 08/31/2021     Note Last Updated: 8/31/2021     Added automatically from request for surgery [id number]     • History of head and neck radiation 09/30/2020   • Hyperlipidemia 09/24/2019     Note Last  Updated: 10/5/2021     3.9% 10 year risk on 9/30/2020, 3.7% 10 year risk on 10/5/2021.     • History of tongue cancer 09/24/2019     Note Last Updated: 9/24/2019     Initial in 1996.  Surgery.  Reoccurrence in 2008.  Radiation at that time.       • Eczema 09/04/2018   • Osteopenia of multiple sites 09/04/2018   • Hypothyroidism 08/18/2016       Past Medical History:   Diagnosis Date   • Allergic 1957   • Carcinoma of tongue (HCC)    • Cataract removed 10-   • Colon polyp 2009   • GERD (gastroesophageal reflux disease) 2022   • History of eczema    • History of fracture of clavicle    • Hypothyroidism        Past Surgical History:   Procedure Laterality Date   • BREAST BIOPSY Bilateral    • BREAST CYST ASPIRATION Bilateral 2009   • BREAST MASS EXCISION Right    • COLONOSCOPY N/A 09/30/2021    Procedure: COLONOSCOPY TO CECUM/TI WITH POLYPECTOMY ( HOT SNARE/ COLD BX) SALINE LIFT;  Surgeon: Sourav Romo MD;  Location: [facility] ENDOSCOPY;  Service: General;  Laterality: N/A;  HX OF COLON POLYP; FAM HX OF COLON CANCER  POST: COLON POLYP   • ENDOSCOPY N/A 6/28/2022    Procedure: ESOPHAGOGASTRODUODENOSCOPY WITH COLD BIOPSIES;  Surgeon: Sourav Romo MD;  Location: [facility] ENDOSCOPY;  Service: General;  Laterality: N/A;  PRE- DYSPHAGIA  POST- GASTRITIS   • EYE SURGERY     • NASAL SEPTUM SURGERY  2005    deviation repair   • TONGUE SURGERY         Family History   Problem Relation Age of Onset   • Macular degeneration Mother    • Colon cancer Father    • Melanoma Sister    • Macular degeneration Maternal Grandmother    • Thyroid disease Paternal Grandmother    • Coronary artery disease Other    • Malig Hyperthermia Neg Hx        Social History     Socioeconomic History   • Marital status: Single   Tobacco Use   • Smoking status: Never   • Smokeless tobacco: Never   Vaping Use   • Vaping Use: Never used   Substance and Sexual Activity   • Alcohol use: No   • Drug use: No   • Sexual activity: Not  Currently     Partners: Male       Current Outpatient Medications on File Prior to Visit   Medication Sig Dispense Refill   • levothyroxine (SYNTHROID, LEVOTHROID) 75 MCG tablet TAKE 1 TABLET BY MOUTH DAILY 90 tablet 3   • [DISCONTINUED] aspirin 325 MG tablet Take 325 mg by mouth Daily. Took 2 6/27/22. Patient doesn't normally take this medication     • [DISCONTINUED] omeprazole (priLOSEC) 40 MG capsule Take 1 capsule by mouth Daily. 60 capsule 0     No current facility-administered medications on file prior to visit.       Allergies   Allergen Reactions   • Cephalexin GI Intolerance   • Hydrocortisone Rash   • Latex Rash   • Neomycin-Bacitracin Zn-Polymyx Rash       Immunization History   Administered Date(s) Administered   • COVID-19 (PFIZER) BIVALENT BOOSTER 12+YRS 12/13/2022   • COVID-19 (PFIZER) PURPLE CAP 03/09/2021, 03/31/2021, 10/12/2021   • Covid-19 (Pfizer) Gray Cap 08/19/2022   • Flu Vaccine Quad PF >36MO 10/08/2020   • FluLaval/Fluzone >6mos 10/30/2019   • Fluzone High-Dose 65+yrs 10/22/2022   • Fluzone Quad >6mos (Multi-dose) 10/08/2020   • Hepatitis A 09/04/2018, 05/03/2019   • Tdap 05/01/2010, 10/05/2021   • Zostavax 11/13/2015       Objective     /80   Pulse 73   Ht 167.6 cm (65.98\")   Wt 51.7 kg (114 lb)   SpO2 97%   BMI 18.41 kg/m²     Physical Exam  Constitutional:       Appearance: She is well-developed.   HENT:      Head: Normocephalic and atraumatic.      Right Ear: Hearing, tympanic membrane and external ear normal.      Left Ear: Hearing, tympanic membrane and external ear normal.      Nose: Nose normal.   Neck:      Thyroid: No thyromegaly.   Cardiovascular:      Rate and Rhythm: Normal rate and regular rhythm.      Heart sounds: Normal heart sounds. No murmur heard.  Pulmonary:      Effort: Pulmonary effort is normal.      Breath sounds: Normal breath sounds.   Abdominal:      General: There is no distension.      Palpations: Abdomen is soft.      Tenderness: There is no  abdominal tenderness.   Musculoskeletal:      Cervical back: Neck supple.   Lymphadenopathy:      Cervical: No cervical adenopathy.   Skin:     General: Skin is warm and dry.   Neurological:      Mental Status: She is alert and oriented to person, place, and time.   Psychiatric:         Speech: Speech normal.         Behavior: Behavior normal.         Thought Content: Thought content normal.         Judgment: Judgment normal.         Assessment & Plan   Diagnoses and all orders for this visit:    1. Well adult exam (Primary)    2. Screening for osteoporosis    3. Menopause  -     DEXA Bone Density Axial; Future    Other orders  -     Pneumococcal Conjugate Vaccine 20-Valent (PCV20)        Discussion    Patient presents today for a CPE.      Patient follows a healthy diet.   Patient follows an adequate exercise regimen. Patient will schedule a mammogram. Colon cancer screening is up to date.   Pap smears are performed by the patient's gynecologist.  I have recommended that the patient get the following immunizations:  Shingrix and Prevnar 20.  HLD.  I reviewed cholesterol labs with the patient.  I think the patient needs to consider a statin medication.  I would do blood work every 3 months initially to monitor liver enzymes.  Side effects could include muscle aches which the patient should let me know if they have.   She will consider but would like to increase attention to diet and recheck labs in three months.          Health Maintenance   Topic Date Due   • ZOSTER VACCINE (2 of 3) 01/08/2016   • Pneumococcal Vaccine 65+ (1 - PCV) Never done   • ANNUAL PHYSICAL  10/05/2022   • DXA SCAN  12/17/2022   • MAMMOGRAM  07/06/2023   • LIPID PANEL  12/30/2023   • COLORECTAL CANCER SCREENING  09/30/2024   • TDAP/TD VACCINES (3 - Td or Tdap) 10/05/2031   • HEPATITIS C SCREENING  Completed   • COVID-19 Vaccine  Completed   • INFLUENZA VACCINE  Completed            Future Appointments   Date Time Provider Department Center    4/7/2023  8:10 AM [facility]   2/23/2024  8:00 AM [facility]   3/1/2024  8:15 AM Saniya Walker MD [facility]

## 2023-01-05 ENCOUNTER — TELEPHONE (OUTPATIENT)
Dept: INTERNAL MEDICINE | Facility: CLINIC | Age: 68
End: 2023-01-05
Payer: COMMERCIAL

## 2023-01-05 NOTE — TELEPHONE ENCOUNTER
Caller: Joanne Martin    Relationship: Self    Best call back number: [phone]    What was the call regarding: PATIENT CALLING STATING THAT [facility] CALLED AND STATED THAT SINCE SHE HAD HER LAST BONE DENSITY TEST AT [facility] - [place], [place] SHE SHOULD GET THE SCAN THERE AGAIN.

## 2023-07-24 ENCOUNTER — OFFICE VISIT (OUTPATIENT)
Dept: INTERNAL MEDICINE | Facility: CLINIC | Age: 68
End: 2023-07-24
Payer: COMMERCIAL

## 2023-07-24 VITALS
HEIGHT: 66 IN | SYSTOLIC BLOOD PRESSURE: 126 MMHG | WEIGHT: 124 LBS | DIASTOLIC BLOOD PRESSURE: 80 MMHG | OXYGEN SATURATION: 98 % | HEART RATE: 63 BPM | BODY MASS INDEX: 19.93 KG/M2

## 2023-07-24 DIAGNOSIS — Z92.3 HISTORY OF HEAD AND NECK RADIATION: ICD-10-CM

## 2023-07-24 DIAGNOSIS — M81.8 OTHER OSTEOPOROSIS WITHOUT CURRENT PATHOLOGICAL FRACTURE: Primary | ICD-10-CM

## 2023-07-24 PROCEDURE — 99213 OFFICE O/P EST LOW 20 MIN: CPT | Performed by: INTERNAL MEDICINE

## 2023-07-24 RX ORDER — SODIUM FLUORIDE 5 MG/G
GEL, DENTIFRICE DENTAL SEE ADMIN INSTRUCTIONS
COMMUNITY
Start: 2023-06-09

## 2023-07-24 NOTE — PROGRESS NOTES
Subjective     Joanne Martin is a 68 y.o. female who presents with   Chief Complaint   Patient presents with    Osteoporosis       Osteoporosis       The following data was reviewed by: Saniya Walker MD on 07/24/2023:    Data reviewed : Radiologic studies DEXA      Osteoporosis found on most recent DEXA.  I reviewed treatments for bone density with the patient.  We discussed the bisphosphonate class of drugs.  I reviewed the side effects which include esophagitis, rare atypical fracture and rare jaw osteonecrosis.  The benefit is a decrease in the risk for fracture.  Reviewed with patient how to take bisphosphonate class of drugs.  They are to be taken with a full class of water first thing in the morning with nothing by mouth for one hour after.     Review of Systems   Respiratory: Negative.     Cardiovascular: Negative.      The following portions of the patient's history were reviewed and updated as appropriate: allergies, current medications and problem list.    Patient Active Problem List    Diagnosis Date Noted    Oropharyngeal dysphagia 06/13/2022     Note Last Updated: 6/13/2022     Added automatically from request for surgery [id number]      Abnormal esophagram 06/13/2022     Note Last Updated: 6/13/2022     Added automatically from request for surgery [id number]      History of adenomatous polyp of colon 08/31/2021     Note Last Updated: 8/31/2021     Added automatically from request for surgery [id number]      History of head and neck radiation 09/30/2020    Hyperlipidemia 09/24/2019     Note Last Updated: 10/5/2021     3.9% 10 year risk on 9/30/2020, 3.7% 10 year risk on 10/5/2021.      History of tongue cancer 09/24/2019     Note Last Updated: 9/24/2019     Initial in 1996.  Surgery.  Reoccurrence in 2008.  Radiation at that time.        Eczema 09/04/2018    Other osteoporosis without current pathological fracture 09/04/2018    Hypothyroidism 08/18/2016       Current Outpatient Medications on File Prior to  "Visit   Medication Sig Dispense Refill    levothyroxine (SYNTHROID, LEVOTHROID) 75 MCG tablet TAKE 1 TABLET BY MOUTH DAILY 90 tablet 3    Sodium Fluoride 1.1 % gel See Admin Instructions.       No current facility-administered medications on file prior to visit.       Objective     /80   Pulse 63   Ht 167.6 cm (65.98\")   Wt 56.2 kg (124 lb)   SpO2 98%   BMI 20.02 kg/m²     Physical Exam  Constitutional:       Appearance: She is well-developed.   HENT:      Head: Normocephalic and atraumatic.   Pulmonary:      Effort: Pulmonary effort is normal.   Neurological:      Mental Status: She is alert and oriented to person, place, and time.   Psychiatric:         Behavior: Behavior normal.       Assessment & Plan   Diagnoses and all orders for this visit:    1. Other osteoporosis without current pathological fracture (Primary)    2. History of head and neck radiation        Discussion    OP.  I recommend to get 1200 mg of calcium and 1000 IUs of vitamin D through diet and supplements and to participate in a weight based exercise to prevent loss of bone mineral density. Bone mineral will be monitored every two years.  Consider treatment with bisphosphonate versus Forteo/Tymlos.  History of head and neck radiation ten years ago is a complicating issue.  I will do some additional research and get back to her by phone.      I spent 20 minutes caring for Joanne on this date of service. This time includes time spent by me in the following activities: preparing for the visit, counseling and educating the patient/family/caregiver, and documenting information in the medical record.          Future Appointments   Date Time Provider Department Center   2/23/2024  8:00 AM [facility]   3/1/2024  8:15 AM Saniya Walker MD [facility]         "

## 2023-08-02 ENCOUNTER — OFFICE VISIT (OUTPATIENT)
Dept: INTERNAL MEDICINE | Facility: CLINIC | Age: 68
End: 2023-08-02
Payer: COMMERCIAL

## 2023-08-02 VITALS
DIASTOLIC BLOOD PRESSURE: 80 MMHG | WEIGHT: 124 LBS | HEART RATE: 67 BPM | BODY MASS INDEX: 19.93 KG/M2 | OXYGEN SATURATION: 98 % | HEIGHT: 66 IN | SYSTOLIC BLOOD PRESSURE: 120 MMHG

## 2023-08-02 DIAGNOSIS — M81.8 OTHER OSTEOPOROSIS WITHOUT CURRENT PATHOLOGICAL FRACTURE: ICD-10-CM

## 2023-08-02 DIAGNOSIS — G89.29 CHRONIC BILATERAL LOW BACK PAIN WITHOUT SCIATICA: Primary | ICD-10-CM

## 2023-08-02 DIAGNOSIS — M54.50 CHRONIC BILATERAL LOW BACK PAIN WITHOUT SCIATICA: Primary | ICD-10-CM

## 2023-08-02 PROCEDURE — 99213 OFFICE O/P EST LOW 20 MIN: CPT | Performed by: INTERNAL MEDICINE

## 2023-08-04 ENCOUNTER — TELEPHONE (OUTPATIENT)
Dept: INTERNAL MEDICINE | Facility: CLINIC | Age: 68
End: 2023-08-04
Payer: COMMERCIAL

## 2023-08-07 RX ORDER — ALENDRONATE SODIUM 70 MG/1
TABLET ORAL
Qty: 12 TABLET | Refills: 3 | Status: SHIPPED | OUTPATIENT
Start: 2023-08-07

## 2023-10-23 RX ORDER — LEVOTHYROXINE SODIUM 0.07 MG/1
75 TABLET ORAL DAILY
Qty: 90 TABLET | Refills: 3 | Status: SHIPPED | OUTPATIENT
Start: 2023-10-23

## 2024-02-21 DIAGNOSIS — E03.9 ACQUIRED HYPOTHYROIDISM: ICD-10-CM

## 2024-02-21 DIAGNOSIS — E78.5 HYPERLIPIDEMIA, UNSPECIFIED HYPERLIPIDEMIA TYPE: Primary | ICD-10-CM

## 2024-02-23 LAB
ALBUMIN SERPL-MCNC: 4.2 G/DL (ref 3.5–5.2)
ALBUMIN/GLOB SERPL: 2 G/DL
ALP SERPL-CCNC: 73 U/L (ref 39–117)
ALT SERPL-CCNC: 18 U/L (ref 1–33)
APPEARANCE UR: CLEAR
AST SERPL-CCNC: 18 U/L (ref 1–32)
BACTERIA #/AREA URNS HPF: NORMAL /HPF
BASOPHILS # BLD AUTO: 0.06 10*3/MM3 (ref 0–0.2)
BASOPHILS NFR BLD AUTO: 1.1 % (ref 0–1.5)
BILIRUB SERPL-MCNC: 0.4 MG/DL (ref 0–1.2)
BILIRUB UR QL STRIP: NEGATIVE
BUN SERPL-MCNC: 13 MG/DL (ref 8–23)
BUN/CREAT SERPL: 16 (ref 7–25)
CALCIUM SERPL-MCNC: 9.3 MG/DL (ref 8.6–10.5)
CASTS URNS MICRO: NORMAL
CHLORIDE SERPL-SCNC: 101 MMOL/L (ref 98–107)
CHOLEST SERPL-MCNC: 199 MG/DL (ref 0–200)
CO2 SERPL-SCNC: 25.3 MMOL/L (ref 22–29)
COLOR UR: YELLOW
CREAT SERPL-MCNC: 0.81 MG/DL (ref 0.57–1)
EGFRCR SERPLBLD CKD-EPI 2021: 79.7 ML/MIN/1.73
EOSINOPHIL # BLD AUTO: 0.35 10*3/MM3 (ref 0–0.4)
EOSINOPHIL NFR BLD AUTO: 6.7 % (ref 0.3–6.2)
EPI CELLS #/AREA URNS HPF: NORMAL /HPF
ERYTHROCYTE [DISTWIDTH] IN BLOOD BY AUTOMATED COUNT: 13 % (ref 12.3–15.4)
GLOBULIN SER CALC-MCNC: 2.1 GM/DL
GLUCOSE SERPL-MCNC: 89 MG/DL (ref 65–99)
GLUCOSE UR QL STRIP: NEGATIVE
HCT VFR BLD AUTO: 42.3 % (ref 34–46.6)
HDLC SERPL-MCNC: 80 MG/DL (ref 40–60)
HGB BLD-MCNC: 14.4 G/DL (ref 12–15.9)
HGB UR QL STRIP: NEGATIVE
IMM GRANULOCYTES # BLD AUTO: 0.01 10*3/MM3 (ref 0–0.05)
IMM GRANULOCYTES NFR BLD AUTO: 0.2 % (ref 0–0.5)
KETONES UR QL STRIP: NEGATIVE
LDLC SERPL CALC-MCNC: 104 MG/DL (ref 0–100)
LEUKOCYTE ESTERASE UR QL STRIP: NEGATIVE
LYMPHOCYTES # BLD AUTO: 1.73 10*3/MM3 (ref 0.7–3.1)
LYMPHOCYTES NFR BLD AUTO: 33 % (ref 19.6–45.3)
MCH RBC QN AUTO: 30.9 PG (ref 26.6–33)
MCHC RBC AUTO-ENTMCNC: 34 G/DL (ref 31.5–35.7)
MCV RBC AUTO: 90.8 FL (ref 79–97)
MONOCYTES # BLD AUTO: 0.53 10*3/MM3 (ref 0.1–0.9)
MONOCYTES NFR BLD AUTO: 10.1 % (ref 5–12)
NEUTROPHILS # BLD AUTO: 2.56 10*3/MM3 (ref 1.7–7)
NEUTROPHILS NFR BLD AUTO: 48.9 % (ref 42.7–76)
NITRITE UR QL STRIP: NEGATIVE
NRBC BLD AUTO-RTO: 0 /100 WBC (ref 0–0.2)
PH UR STRIP: 8 [PH] (ref 5–8)
PLATELET # BLD AUTO: 244 10*3/MM3 (ref 140–450)
POTASSIUM SERPL-SCNC: 4.4 MMOL/L (ref 3.5–5.2)
PROT SERPL-MCNC: 6.3 G/DL (ref 6–8.5)
PROT UR QL STRIP: NEGATIVE
RBC # BLD AUTO: 4.66 10*6/MM3 (ref 3.77–5.28)
RBC #/AREA URNS HPF: NORMAL /HPF
SODIUM SERPL-SCNC: 137 MMOL/L (ref 136–145)
SP GR UR STRIP: NORMAL (ref 1–1.03)
TRIGL SERPL-MCNC: 87 MG/DL (ref 0–150)
TSH SERPL DL<=0.005 MIU/L-ACNC: 1.62 UIU/ML (ref 0.27–4.2)
UROBILINOGEN UR STRIP-MCNC: NORMAL MG/DL
VLDLC SERPL CALC-MCNC: 15 MG/DL (ref 5–40)
WBC # BLD AUTO: 5.24 10*3/MM3 (ref 3.4–10.8)
WBC #/AREA URNS HPF: NORMAL /HPF

## 2024-03-01 ENCOUNTER — OFFICE VISIT (OUTPATIENT)
Dept: INTERNAL MEDICINE | Facility: CLINIC | Age: 69
End: 2024-03-01
Payer: COMMERCIAL

## 2024-03-01 VITALS
HEIGHT: 66 IN | DIASTOLIC BLOOD PRESSURE: 80 MMHG | OXYGEN SATURATION: 98 % | HEART RATE: 74 BPM | BODY MASS INDEX: 19.61 KG/M2 | WEIGHT: 122 LBS | SYSTOLIC BLOOD PRESSURE: 124 MMHG

## 2024-03-01 DIAGNOSIS — Z00.00 WELL ADULT EXAM: Primary | ICD-10-CM

## 2024-03-01 DIAGNOSIS — K22.0 CRICOPHARYNGEAL ACHALASIA: ICD-10-CM

## 2024-03-01 PROBLEM — R93.3 ABNORMAL ESOPHAGRAM: Status: RESOLVED | Noted: 2022-06-13 | Resolved: 2024-03-01

## 2024-03-01 PROCEDURE — 99397 PER PM REEVAL EST PAT 65+ YR: CPT | Performed by: INTERNAL MEDICINE

## 2024-03-01 NOTE — PROGRESS NOTES
Subjective     Joanne Martin is a 69 y.o. female who presents for a complete physical exam.      History of Present Illness     The following data was reviewed by: Saniya Walker MD on 03/01/2024:  Common labs          2/23/2024    08:07   Common Labs   Glucose 89    BUN 13    Creatinine 0.81    Sodium 137    Potassium 4.4    Chloride 101    Calcium 9.3    Total Protein 6.3    Albumin 4.2    Total Bilirubin 0.4    Alkaline Phosphatase 73    AST (SGOT) 18    ALT (SGPT) 18    WBC 5.24    Hemoglobin 14.4    Hematocrit 42.3    Platelets 244    Total Cholesterol 199    Triglycerides 87    HDL Cholesterol 80    LDL Cholesterol  104      HLD.  Much improved.    Hypothyroidism.  Control is good.   OP.  She is tolerating Fosamax.     She is going to be due for colonoscopy.  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        Review of Systems   Constitutional: Negative.    HENT: Negative.     Eyes: Negative.    Respiratory: Negative.     Cardiovascular: Negative.    Gastrointestinal: Negative.    Endocrine: Negative.    Genitourinary: Negative.    Musculoskeletal: Negative.    Skin: Negative.    Allergic/Immunologic: Negative.    Neurological: Negative.    Hematological: Negative.    Psychiatric/Behavioral: Negative.         The following portions of the patient's history were reviewed and updated as appropriate: allergies, current medications, past family history, past medical history, past social history, past surgical history and problem list.  Health maintenance tab was reviewed and updated with the patient.       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      History of tongue cancer 09/24/2019     Note Last Updated: 9/24/2019     Initial in 1996.  Surgery.  Reoccurrence in 2008.  Radiation at that time.        Eczema 09/04/2018    Other osteoporosis without current pathological fracture 09/04/2018    Hypothyroidism 08/18/2016       Past Medical History:   Diagnosis Date    Allergic 1957    Carcinoma of tongue     Cataract removed 10-    Colon polyp 2009    GERD (gastroesophageal reflux disease) 2022    History of eczema     History of fracture of clavicle     Hypothyroidism        Past Surgical History:   Procedure Laterality Date    BREAST BIOPSY Bilateral     BREAST CYST ASPIRATION Bilateral 2009    BREAST MASS EXCISION Right     COLONOSCOPY N/A 09/30/2021    Procedure: COLONOSCOPY TO CECUM/TI WITH POLYPECTOMY ( HOT SNARE/ COLD BX) SALINE LIFT;  Surgeon: Sourav Romo MD;  Location: [facility] ENDOSCOPY;  Service: General;  Laterality: N/A;  HX OF COLON POLYP; FAM HX OF COLON CANCER  POST: COLON POLYP    ENDOSCOPY N/A 6/28/2022    Procedure: ESOPHAGOGASTRODUODENOSCOPY WITH COLD BIOPSIES;  Surgeon: Sourav Romo MD;  Location: [facility] ENDOSCOPY;  Service: General;  Laterality: N/A;  PRE- DYSPHAGIA  POST- GASTRITIS    EYE SURGERY      NASAL SEPTUM SURGERY  2005    deviation repair    TONGUE SURGERY         Family History   Problem Relation Age of Onset    Macular degeneration Mother     Colon cancer Father     Melanoma Sister     Macular degeneration Maternal Grandmother     Thyroid disease Paternal Grandmother     Coronary artery disease Other     Malig Hyperthermia Neg Hx        Social History     Socioeconomic History    Marital status: Single   Tobacco Use    Smoking status: Never    Smokeless tobacco: Never   Vaping Use    Vaping  "Use: Never used   Substance and Sexual Activity    Alcohol use: No    Drug use: No    Sexual activity: Not Currently     Partners: Male       Current Outpatient Medications on File Prior to Visit   Medication Sig Dispense Refill    alendronate (Fosamax) 70 MG tablet Take one tablet weekly with a full class of water first thing in the morning with nothing by mouth or laying down for one hour after. 12 tablet 3    levothyroxine (SYNTHROID, LEVOTHROID) 75 MCG tablet TAKE 1 TABLET BY MOUTH DAILY 90 tablet 3    Sodium Fluoride 1.1 % gel See Admin Instructions.       No current facility-administered medications on file prior to visit.       Allergies   Allergen Reactions    Cephalexin GI Intolerance    Hydrocortisone Rash    Latex Rash    Neomycin-Bacitracin Zn-Polymyx Rash       Immunization History   Administered Date(s) Administered    COVID-19 (PFIZER) BIVALENT 12+YRS 12/13/2022    COVID-19 (PFIZER) Purple Cap Monovalent 03/09/2021, 03/31/2021, 10/12/2021    COVID-19 F23 (PFIZER) 12YRS+ (COMIRNATY) 10/18/2023    Covid-19 (Pfizer) Gray Cap Monovalent 08/19/2022    Flu Vaccine Quad PF >36MO 10/08/2020    Fluzone (or Fluarix & Flulaval for VFC) >6mos 10/30/2019, 10/08/2020, 01/04/2023    Fluzone High-Dose 65+yrs 10/22/2022, 10/18/2023    Fluzone Quad >6mos (Multi-dose) 10/08/2020    Hepatitis A 09/04/2018, 05/03/2019    Pneumococcal Conjugate 20-Valent (PCV20) 01/04/2023    Tdap 05/01/2010, 10/05/2021    Zostavax 11/13/2015       Objective     /80   Pulse 74   Ht 167.6 cm (65.98\")   Wt 55.3 kg (122 lb)   SpO2 98%   BMI 19.70 kg/m²     Physical Exam  Constitutional:       Appearance: She is well-developed.   HENT:      Head: Normocephalic and atraumatic.      Right Ear: Hearing, tympanic membrane and external ear normal.      Left Ear: Hearing, tympanic membrane and external ear normal.      Nose: Nose normal.   Neck:      Thyroid: No thyromegaly.   Cardiovascular:      Rate and Rhythm: Normal rate and regular " rhythm.      Heart sounds: Normal heart sounds. No murmur heard.  Pulmonary:      Effort: Pulmonary effort is normal.      Breath sounds: Normal breath sounds.   Chest:   Breasts:     Right: Normal.      Left: Normal.   Abdominal:      General: There is no distension.      Palpations: Abdomen is soft.      Tenderness: There is no abdominal tenderness.   Musculoskeletal:      Cervical back: Neck supple.   Lymphadenopathy:      Cervical: No cervical adenopathy.   Skin:     General: Skin is warm and dry.   Neurological:      Mental Status: She is alert and oriented to person, place, and time.   Psychiatric:         Speech: Speech normal.         Behavior: Behavior normal.         Thought Content: Thought content normal.         Judgment: Judgment normal.         Assessment & Plan   Diagnoses and all orders for this visit:    1. Well adult exam (Primary)    2. Cricopharyngeal achalasia  -     Ambulatory Referral to Gastroenterology        Discussion    Patient presents today for a CPE.      Patient follows a healthy diet.   Patient follows an adequate exercise regimen. Mammogram is up to date.   Patient has been referred for colon cancer screening.   Pap smears are performed by the patient's gynecologist.   DEXA is up to date.   Discussed importance of preventative care including vaccinations, age appropriate cancer screening, routine lab work, healthy diet, and active lifestyle.  I have recommended that the patient get the following immunizations:  Shingrix.      Health Maintenance   Topic Date Due    ZOSTER VACCINE (2 of 3) 01/08/2016    RSV Vaccine - Adults (1 - 1-dose 60+ series) Never done    ANNUAL PHYSICAL  01/04/2024    MAMMOGRAM  07/07/2024    COLORECTAL CANCER SCREENING  09/30/2024    LIPID PANEL  02/23/2025    DXA SCAN  07/07/2025    TDAP/TD VACCINES (3 - Td or Tdap) 10/05/2031    HEPATITIS C SCREENING  Completed    COVID-19 Vaccine  Completed    INFLUENZA VACCINE  Completed    Pneumococcal Vaccine 65+   Completed            Future Appointments   Date Time Provider Department Center   2/25/2025  8:40 AM [facility]   3/4/2025  8:15 AM Saniya Walker MD [facility]

## 2024-03-19 ENCOUNTER — OFFICE VISIT (OUTPATIENT)
Dept: GASTROENTEROLOGY | Facility: CLINIC | Age: 69
End: 2024-03-19
Payer: COMMERCIAL

## 2024-03-19 VITALS
OXYGEN SATURATION: 98 % | DIASTOLIC BLOOD PRESSURE: 79 MMHG | TEMPERATURE: 98.6 F | WEIGHT: 125 LBS | HEART RATE: 84 BPM | BODY MASS INDEX: 20.09 KG/M2 | HEIGHT: 66 IN | SYSTOLIC BLOOD PRESSURE: 104 MMHG

## 2024-03-19 DIAGNOSIS — K20.0 ESOPHAGITIS, EOSINOPHILIC: ICD-10-CM

## 2024-03-19 DIAGNOSIS — R13.19 ESOPHAGEAL DYSPHAGIA: Primary | ICD-10-CM

## 2024-03-19 PROCEDURE — 99204 OFFICE O/P NEW MOD 45 MIN: CPT | Performed by: PHYSICIAN ASSISTANT

## 2024-03-19 RX ORDER — OMEPRAZOLE 20 MG/1
20 CAPSULE, DELAYED RELEASE ORAL DAILY
Qty: 30 CAPSULE | Refills: 4 | Status: SHIPPED | OUTPATIENT
Start: 2024-03-19

## 2024-03-19 NOTE — PROGRESS NOTES
"Chief Complaint  epigastric discomfort and Difficulty Swallowing    Subjective          History Of Present Illness:    Joanne Martin is a  69 y.o. female patient who is being referred by Dr. Walker for evaluation of epigastric discomfort and dysphagia. Patient's additional medical history includes hyperlipidemia, hypothyroidism, osteoporosis.    Upper GI series reviewed from 5/31/2022 with evidence of cricopharyngeal achalasia arise normal-appearing stomach and small bowel.  She did have an EGD performed on 6/28/2022 with Dr. Romo with an irregular Z-line but no obvious endoscopic esophageal abnormality to explain her dysphagia.  She did have gastritis.  Normal-appearing duodenum.  Gastric biopsies with chronic gastritis, no H. pylori or intestinal metaplasia.  Esophageal biopsies consistent with intraepithelial eosinophils greater than 75/hpf on one biopsy. Biopsies with clinical symptoms suggestive of eosinophilic esophagitis.     Patient reports since 2017 she has been having intermittent episode where it feels like her stomach \"blocks off\" and she has the sensation of cramping in her epigastric region. Patient will have regurgitation of food during these episodes. She feels like she is able to pass the food when she moves around. She does not report any pain but describes it as an aching sensation. Patient reports the episodes occur intermittently and make her reflux worse which she describes as a burning sensation in her chest and stomach. Patient was advised to take omeprazole back in 2022 by Dr. Romo but had hesitations due to the associated side effects. Patient does have a history of neck radiation due to tongue cancer.     Of note patient is additionally on fosamax which can cause esophagitis.     Additional data reviewed:  Colonoscopy 9/30/2021 -normal-appearing terminal ileum, 1 polyp in the rectum, otherwise normal.  Colon polyps consistent with tubular adenoma and hyperplastic polyps.     Objective " "  Vital Signs:   /79   Pulse 84   Temp 98.6 °F (37 °C)   Ht 167.6 cm (66\")   Wt 56.7 kg (125 lb)   SpO2 98%   BMI 20.18 kg/m²       Physical Exam  Vitals reviewed.   Constitutional:       General: She is not in acute distress.     Appearance: Normal appearance. She is not ill-appearing.   HENT:      Head: Normocephalic and atraumatic.      Nose: Nose normal.      Mouth/Throat:      Lips: Pink. No lesions.      Mouth: Mucous membranes are moist. Oral lesions present.      Pharynx: Oropharynx is clear. No oropharyngeal exudate or posterior oropharyngeal erythema.   Eyes:      Extraocular Movements: Extraocular movements intact.      Conjunctiva/sclera: Conjunctivae normal.      Pupils: Pupils are equal, round, and reactive to light.   Neck:      Thyroid: No thyroid mass.   Pulmonary:      Effort: Pulmonary effort is normal.   Abdominal:      General: Abdomen is flat. There is no distension.      Palpations: Abdomen is soft. There is no mass.      Tenderness: There is no abdominal tenderness.   Musculoskeletal:         General: No swelling. Normal range of motion.      Cervical back: Normal range of motion. No rigidity.   Lymphadenopathy:      Cervical: No cervical adenopathy.      Right cervical: No superficial cervical adenopathy.     Left cervical: No superficial cervical adenopathy.   Skin:     General: Skin is warm and dry.      Findings: No bruising or rash.   Neurological:      General: No focal deficit present.      Mental Status: She is alert and oriented to person, place, and time.      Motor: No weakness.      Gait: Gait normal.   Psychiatric:         Mood and Affect: Mood normal.          Result Review :   The following data was reviewed by: Bailey Vazquez PA-C on 03/19/2024:  CMP          2/23/2024    08:07   CMP   Glucose 89    BUN 13    Creatinine 0.81    Sodium 137    Potassium 4.4    Chloride 101    Calcium 9.3    Total Protein 6.3    Albumin 4.2    Globulin 2.1    Total Bilirubin " 0.4    Alkaline Phosphatase 73    AST (SGOT) 18    ALT (SGPT) 18    BUN/Creatinine Ratio 16.0      CBC          2/23/2024    08:07   CBC   WBC 5.24    RBC 4.66    Hemoglobin 14.4    Hematocrit 42.3    MCV 90.8    MCH 30.9    MCHC 34.0    RDW 13.0    Platelets 244            Assessment and Plan    Diagnoses and all orders for this visit:    1. Esophageal dysphagia (Primary)  -     omeprazole (priLOSEC) 20 MG capsule; Take 1 capsule by mouth Daily.  Dispense: 30 capsule; Refill: 4    2. Esophagitis, eosinophilic  -     omeprazole (priLOSEC) 20 MG capsule; Take 1 capsule by mouth Daily.  Dispense: 30 capsule; Refill: 4       We had extensive discussions about her EGD performed with Dr. Romo which was suggestive of eosinophilic esophagitis. She does already have osteoperosis and is on fosfamax therapy which can cause esophagitis. Based on these findings I have a suspicious her episodes described above are secondary to food getting stuck and ultimately resulting in esophageal spasms.  Recommend we start omeprazole daily for 3 months.   Patient did have concerns about taking a PPI but we did discuss the risks such as osteoporosis and kidney disease are associated with long-term use. Additionally PPIs are the initial recommended treatment for EoE.   Patient is due for screening colonoscopy in September. We will plan to schedule this along with an upper endoscopy on her next office visit so that we can evaluate for resolution of her EoE.   Can consider steroids vs dupixent if EoE and symptoms persists.   Recommend she eat slow, chew well, and take small sips with her bites.   We did discuss the findings of cricopharyngeal dysphagia on her upper GI series in 2022 which doesn't seem to be causing her problems at this moment. Most of her symptoms seem to be occurring in her distal esophagus. This finding is likely secondary to history of neck radiation for oral cancer.       Follow Up   Return in about 3 months (around  6/19/2024) for Bailey Galvin PA-C.    Dragon dictation used throughout this note.              Bailey Galvin PA-C   [facility] [place], operated by [facility]  [address] [place] [address]  Office: [phone]

## 2024-06-18 ENCOUNTER — OFFICE VISIT (OUTPATIENT)
Dept: GASTROENTEROLOGY | Facility: CLINIC | Age: 69
End: 2024-06-18
Payer: COMMERCIAL

## 2024-06-18 ENCOUNTER — TELEPHONE (OUTPATIENT)
Dept: GASTROENTEROLOGY | Facility: CLINIC | Age: 69
End: 2024-06-18
Payer: COMMERCIAL

## 2024-06-18 VITALS
HEART RATE: 76 BPM | BODY MASS INDEX: 20.12 KG/M2 | DIASTOLIC BLOOD PRESSURE: 73 MMHG | SYSTOLIC BLOOD PRESSURE: 126 MMHG | TEMPERATURE: 98.1 F | WEIGHT: 125.2 LBS | HEIGHT: 66 IN

## 2024-06-18 DIAGNOSIS — K20.0 ESOPHAGITIS, EOSINOPHILIC: Primary | ICD-10-CM

## 2024-06-18 DIAGNOSIS — Z86.010 PERSONAL HISTORY OF COLONIC POLYPS: ICD-10-CM

## 2024-06-18 PROBLEM — Z86.0100 PERSONAL HISTORY OF COLONIC POLYPS: Status: ACTIVE | Noted: 2024-06-18

## 2024-06-18 NOTE — PROGRESS NOTES
"Chief Complaint  Difficulty Swallowing    Subjective          History Of Present Illness:    Joanne Martin is a  69 y.o. female patient of Dr. Bowers who presents as a follow-up for dysphagia and eosinophilic esophagitis.    Patient has had no other episodes of dysphagia.  She has been more careful with eating slowly and chewing well. She denies any nausea or vomiting. She is taking omeprazole 20 mg daily with the exception of Mondays when she takes her alendronate. Patient does continue to have hesitations with taking omeprazole.     Patient does report more diarrhea starting last week with increased frequency. Diarrhea has improved and she now describes the stools as loose. Patient denies abdominal pain, nausea, vomiting, blood in the stool.      Additional data reviewed:  Colonoscopy 9/30/2021 -normal-appearing terminal ileum, 1 polyp in the rectum, otherwise normal.  Colon polyps consistent with tubular adenoma and hyperplastic polyps.  EGD 6/28/2022 with Dr. Romo with an irregular Z-line but no obvious endoscopic esophageal abnormality to explain her dysphagia.  She did have gastritis.  Normal-appearing duodenum.  Gastric biopsies with chronic gastritis, no H. pylori or intestinal metaplasia.  Esophageal biopsies consistent with intraepithelial eosinophils greater than 75/hpf on one biopsy. Biopsies with clinical symptoms suggestive of eosinophilic esophagitis.     Objective   Vital Signs:   /73   Pulse 76   Temp 98.1 °F (36.7 °C)   Ht 167.6 cm (66\")   Wt 56.8 kg (125 lb 3.2 oz)   BMI 20.21 kg/m²       Physical Exam  Vitals reviewed.   Constitutional:       General: She is not in acute distress.     Appearance: Normal appearance. She is not ill-appearing.   HENT:      Head: Normocephalic and atraumatic.      Nose: Nose normal.      Mouth/Throat:      Pharynx: Oropharynx is clear.   Eyes:      Conjunctiva/sclera: Conjunctivae normal.   Pulmonary:      Effort: Pulmonary effort is normal. "   Musculoskeletal:         General: No swelling. Normal range of motion.      Cervical back: Normal range of motion.   Skin:     General: Skin is warm and dry.      Findings: No bruising or rash.   Neurological:      General: No focal deficit present.      Mental Status: She is alert and oriented to person, place, and time.      Motor: No weakness.      Gait: Gait normal.   Psychiatric:         Mood and Affect: Mood normal.          Result Review :   The following data was reviewed by: Bailey Vazquez PA-C on 06/18/2024:  CMP          2/23/2024    08:07   CMP   Glucose 89    BUN 13    Creatinine 0.81    Sodium 137    Potassium 4.4    Chloride 101    Calcium 9.3    Total Protein 6.3    Albumin 4.2    Globulin 2.1    Total Bilirubin 0.4    Alkaline Phosphatase 73    AST (SGOT) 18    ALT (SGPT) 18    BUN/Creatinine Ratio 16.0      CBC          2/23/2024    08:07   CBC   WBC 5.24    RBC 4.66    Hemoglobin 14.4    Hematocrit 42.3    MCV 90.8    MCH 30.9    MCHC 34.0    RDW 13.0    Platelets 244            Assessment and Plan    Diagnoses and all orders for this visit:    1. Esophagitis, eosinophilic (Primary)  -     Case Request; Standing  -     Implement Anesthesia orders day of procedure.; Standing  -     Verify bowel prep was successful; Standing  -     Give tap water enema if bowel prep was insufficient; Standing  -     Case Request    2. Personal history of colonic polyps  -     Case Request; Standing  -     Implement Anesthesia orders day of procedure.; Standing  -     Verify bowel prep was successful; Standing  -     Give tap water enema if bowel prep was insufficient; Standing  -     Case Request       Continue omeprazole 20 mg daily  Will proceed with EGD for further evaluation of EOE.  If EGD biopsies show ongoing evidence of eosinophilic esophagitis would commend transitioning to Dupixent therapy especially in the setting of patient's hesitancy with continuing long-term PPI use.  Colonoscopy for  screening purposes  Patient did have significant difficulty with MiraLAX bowel prep on her last colonoscopy due to nausea and vomiting. Patient provided with samples of Clenpiq.    Follow Up   Return for EGD/Colonoscopy.    Dragon dictation used throughout this note.            Bailey Galvin PA-C   [facility]  [address] [place] [address]  Office: [phone]

## 2024-06-18 NOTE — TELEPHONE ENCOUNTER
EMAILED TO JOIE   FOR [facility] POINT   COLON/EGD   DATE 10/18/2024  TIME -8AM ARRIVAL TIME

## 2024-07-08 ENCOUNTER — APPOINTMENT (OUTPATIENT)
Dept: WOMENS IMAGING | Facility: HOSPITAL | Age: 69
End: 2024-07-08
Payer: COMMERCIAL

## 2024-07-08 PROCEDURE — 77063 BREAST TOMOSYNTHESIS BI: CPT | Performed by: RADIOLOGY

## 2024-07-08 PROCEDURE — 77067 SCR MAMMO BI INCL CAD: CPT | Performed by: RADIOLOGY

## 2024-07-27 DIAGNOSIS — R13.19 ESOPHAGEAL DYSPHAGIA: ICD-10-CM

## 2024-07-27 DIAGNOSIS — K20.0 ESOPHAGITIS, EOSINOPHILIC: ICD-10-CM

## 2024-07-29 RX ORDER — OMEPRAZOLE 20 MG/1
20 CAPSULE, DELAYED RELEASE ORAL DAILY
Qty: 90 CAPSULE | Refills: 3 | Status: SHIPPED | OUTPATIENT
Start: 2024-07-29

## 2024-08-20 RX ORDER — LEVOTHYROXINE SODIUM 0.07 MG/1
75 TABLET ORAL DAILY
Qty: 90 TABLET | Refills: 3 | Status: SHIPPED | OUTPATIENT
Start: 2024-08-20

## 2024-08-27 RX ORDER — ALENDRONATE SODIUM 70 MG/1
TABLET ORAL
Qty: 12 TABLET | Refills: 3 | Status: SHIPPED | OUTPATIENT
Start: 2024-08-27

## 2024-10-17 ENCOUNTER — TELEPHONE (OUTPATIENT)
Dept: GASTROENTEROLOGY | Facility: CLINIC | Age: 69
End: 2024-10-17

## 2024-10-17 ENCOUNTER — TELEPHONE (OUTPATIENT)
Dept: GASTROENTEROLOGY | Facility: CLINIC | Age: 69
End: 2024-10-17
Payer: COMMERCIAL

## 2024-10-17 NOTE — TELEPHONE ENCOUNTER
Hub staff attempted to follow warm transfer process and was unsuccessful     Caller: Joanne Martin    Relationship to patient: Self    Best call back number:  [phone]    Patient is needing: PT IS RETURNING CALL TO OFFICE REGARDING PROCEDURE PREP QUESTIONS. PLEASE REACH BACK OUT TO PT.

## 2024-10-17 NOTE — TELEPHONE ENCOUNTER
Hub staff attempted to follow warm transfer process and was unsuccessful     Caller: Joanne Martin    Relationship to patient: Self    Best call back number: [phone]    Patient is needing: PT IS REQUESTING APPT TIME/ARRIVAL TIME FOR SCOPE TOMORROW AND HAS SOME QUESTIONS ABOUT PREP INSTRUCTIONS.

## 2024-10-18 ENCOUNTER — ANESTHESIA (OUTPATIENT)
Dept: SURGERY | Facility: SURGERY CENTER | Age: 69
End: 2024-10-18
Payer: COMMERCIAL

## 2024-10-18 ENCOUNTER — ANESTHESIA EVENT (OUTPATIENT)
Dept: SURGERY | Facility: SURGERY CENTER | Age: 69
End: 2024-10-18
Payer: COMMERCIAL

## 2024-10-18 ENCOUNTER — HOSPITAL ENCOUNTER (OUTPATIENT)
Facility: SURGERY CENTER | Age: 69
Setting detail: HOSPITAL OUTPATIENT SURGERY
Discharge: HOME | End: 2024-10-18
Attending: INTERNAL MEDICINE | Admitting: INTERNAL MEDICINE
Payer: COMMERCIAL

## 2024-10-18 VITALS
HEART RATE: 82 BPM | BODY MASS INDEX: 19.37 KG/M2 | WEIGHT: 120 LBS | RESPIRATION RATE: 16 BRPM | SYSTOLIC BLOOD PRESSURE: 102 MMHG | OXYGEN SATURATION: 98 % | DIASTOLIC BLOOD PRESSURE: 72 MMHG | TEMPERATURE: 97.4 F

## 2024-10-18 DIAGNOSIS — K20.0 ESOPHAGITIS, EOSINOPHILIC: ICD-10-CM

## 2024-10-18 DIAGNOSIS — Z86.0100 HISTORY OF COLONIC POLYPS: ICD-10-CM

## 2024-10-18 DIAGNOSIS — Z86.0100 PERSONAL HISTORY OF COLONIC POLYPS: ICD-10-CM

## 2024-10-18 PROCEDURE — S0260 H&P FOR SURGERY: HCPCS | Performed by: INTERNAL MEDICINE

## 2024-10-18 PROCEDURE — 88305 TISSUE EXAM BY PATHOLOGIST: CPT | Performed by: INTERNAL MEDICINE

## 2024-10-18 PROCEDURE — 43249 ESOPH EGD DILATION <30 MM: CPT | Performed by: INTERNAL MEDICINE

## 2024-10-18 PROCEDURE — 43239 EGD BIOPSY SINGLE/MULTIPLE: CPT | Performed by: INTERNAL MEDICINE

## 2024-10-18 PROCEDURE — 25810000003 LACTATED RINGERS PER 1000 ML: Performed by: INTERNAL MEDICINE

## 2024-10-18 PROCEDURE — 25010000002 LIDOCAINE 2% SOLUTION: Performed by: NURSE ANESTHETIST, CERTIFIED REGISTERED

## 2024-10-18 PROCEDURE — C1726 CATH, BAL DIL, NON-VASCULAR: HCPCS | Performed by: INTERNAL MEDICINE

## 2024-10-18 PROCEDURE — 45378 DIAGNOSTIC COLONOSCOPY: CPT | Performed by: INTERNAL MEDICINE

## 2024-10-18 PROCEDURE — 25010000002 GLYCOPYRROLATE 0.2 MG/ML SOLUTION: Performed by: NURSE ANESTHETIST, CERTIFIED REGISTERED

## 2024-10-18 PROCEDURE — 25010000002 PROPOFOL 10 MG/ML EMULSION: Performed by: NURSE ANESTHETIST, CERTIFIED REGISTERED

## 2024-10-18 RX ORDER — PROPOFOL 10 MG/ML
VIAL (ML) INTRAVENOUS CONTINUOUS PRN
Status: DISCONTINUED | OUTPATIENT
Start: 2024-10-18 | End: 2024-10-18 | Stop reason: SURG

## 2024-10-18 RX ORDER — LIDOCAINE HYDROCHLORIDE 10 MG/ML
0.5 INJECTION, SOLUTION INFILTRATION; PERINEURAL ONCE AS NEEDED
Status: DISCONTINUED | OUTPATIENT
Start: 2024-10-18 | End: 2024-10-18 | Stop reason: HOSPADM

## 2024-10-18 RX ORDER — SODIUM CHLORIDE, SODIUM LACTATE, POTASSIUM CHLORIDE, CALCIUM CHLORIDE 600; 310; 30; 20 MG/100ML; MG/100ML; MG/100ML; MG/100ML
1000 INJECTION, SOLUTION INTRAVENOUS CONTINUOUS
Status: DISCONTINUED | OUTPATIENT
Start: 2024-10-18 | End: 2024-10-18 | Stop reason: HOSPADM

## 2024-10-18 RX ORDER — GLYCOPYRROLATE 0.2 MG/ML
INJECTION INTRAMUSCULAR; INTRAVENOUS AS NEEDED
Status: DISCONTINUED | OUTPATIENT
Start: 2024-10-18 | End: 2024-10-18 | Stop reason: SURG

## 2024-10-18 RX ORDER — LORATADINE 10 MG/1
10 CAPSULE, LIQUID FILLED ORAL AS NEEDED
COMMUNITY

## 2024-10-18 RX ORDER — HYDRALAZINE HYDROCHLORIDE 20 MG/ML
10 INJECTION INTRAMUSCULAR; INTRAVENOUS
Status: DISCONTINUED | OUTPATIENT
Start: 2024-10-18 | End: 2024-10-18 | Stop reason: HOSPADM

## 2024-10-18 RX ORDER — FENTANYL CITRATE 50 UG/ML
25 INJECTION, SOLUTION INTRAMUSCULAR; INTRAVENOUS
Status: DISCONTINUED | OUTPATIENT
Start: 2024-10-18 | End: 2024-10-18 | Stop reason: HOSPADM

## 2024-10-18 RX ORDER — CHLORAL HYDRATE 500 MG
1000 CAPSULE ORAL
COMMUNITY

## 2024-10-18 RX ORDER — LABETALOL HYDROCHLORIDE 5 MG/ML
5 INJECTION, SOLUTION INTRAVENOUS
Status: DISCONTINUED | OUTPATIENT
Start: 2024-10-18 | End: 2024-10-18 | Stop reason: HOSPADM

## 2024-10-18 RX ORDER — SODIUM CHLORIDE 0.9 % (FLUSH) 0.9 %
10 SYRINGE (ML) INJECTION AS NEEDED
Status: DISCONTINUED | OUTPATIENT
Start: 2024-10-18 | End: 2024-10-18 | Stop reason: HOSPADM

## 2024-10-18 RX ORDER — LIDOCAINE HYDROCHLORIDE 20 MG/ML
INJECTION, SOLUTION INFILTRATION; PERINEURAL AS NEEDED
Status: DISCONTINUED | OUTPATIENT
Start: 2024-10-18 | End: 2024-10-18 | Stop reason: SURG

## 2024-10-18 RX ORDER — ONDANSETRON 2 MG/ML
4 INJECTION INTRAMUSCULAR; INTRAVENOUS ONCE AS NEEDED
Status: DISCONTINUED | OUTPATIENT
Start: 2024-10-18 | End: 2024-10-18 | Stop reason: HOSPADM

## 2024-10-18 RX ADMIN — PROPOFOL 100 MG: 10 INJECTION, EMULSION INTRAVENOUS at 09:15

## 2024-10-18 RX ADMIN — LIDOCAINE HYDROCHLORIDE 50 MG: 20 INJECTION, SOLUTION INFILTRATION; PERINEURAL at 09:14

## 2024-10-18 RX ADMIN — GLYCOPYRROLATE 0.2 MG: 0.2 INJECTION, SOLUTION INTRAMUSCULAR; INTRAVENOUS at 09:14

## 2024-10-18 RX ADMIN — SODIUM CHLORIDE, POTASSIUM CHLORIDE, SODIUM LACTATE AND CALCIUM CHLORIDE 1000 ML: 600; 310; 30; 20 INJECTION, SOLUTION INTRAVENOUS at 08:07

## 2024-10-18 RX ADMIN — PROPOFOL 160 MCG/KG/MIN: 10 INJECTION, EMULSION INTRAVENOUS at 09:14

## 2024-10-18 NOTE — ANESTHESIA PREPROCEDURE EVALUATION
Anesthesia Evaluation     no history of anesthetic complications:   NPO Solid Status: > 8 hours  NPO Liquid Status: > 2 hours           Airway   Mallampati: III  Possible difficult intubation and Small opening  Dental - normal exam     Pulmonary - negative pulmonary ROS and normal exam   (-) COPD, asthma, sleep apnea, not a smoker    PE comment: nonlabored  Cardiovascular - normal exam  Exercise tolerance: good (4-7 METS)    Rhythm: regular  Rate: normal    (+) hyperlipidemia  (-) hypertension, valvular problems/murmurs, past MI, CAD, dysrhythmias, angina      Neuro/Psych- negative ROS  (-) seizures, TIA, CVA  GI/Hepatic/Renal/Endo    (+) GERD, thyroid problem hypothyroidism  (-) liver disease, no renal disease, diabetes    ROS Comment: Dysphagia    Musculoskeletal (-) negative ROS    Abdominal    Substance History      OB/GYN          Other      history of cancer (carcinoma of tongue x2 -- last in 2012, in remission, had head/neck radiation)                Anesthesia Plan    ASA 3     MAC       Anesthetic plan, risks, benefits, and alternatives have been provided, discussed and informed consent has been obtained with: patient.    CODE STATUS:

## 2024-10-18 NOTE — H&P
[facility]  Pre Procedure History & Physical    Chief Complaint:   Time for my colonoscopy    Subjective     HPI:   69 y.o. female presenting to endoscopy unit today for surveillance colonoscopy. And egd     Past Medical History:   Past Medical History:   Diagnosis Date    Allergic 1957    Carcinoma of tongue     Cataract removed 10-    Colon polyp 2009    GERD (gastroesophageal reflux disease) 2022    History of eczema     History of fracture of clavicle     Hypothyroidism        Family History:  Family History   Problem Relation Age of Onset    Macular degeneration Mother     Colon cancer Father     Melanoma Sister     Macular degeneration Maternal Grandmother     Thyroid disease Paternal Grandmother     Coronary artery disease Other     Malig Hyperthermia Neg Hx        Social History:   reports that she has never smoked. She has never used smokeless tobacco. She reports that she does not drink alcohol and does not use drugs.    Medications:   Medications Prior to Admission   Medication Sig Dispense Refill Last Dose/Taking    levothyroxine (SYNTHROID, LEVOTHROID) 75 MCG tablet TAKE 1 TABLET BY MOUTH DAILY 90 tablet 3 10/17/2024    alendronate (FOSAMAX) 70 MG tablet TAKE 1 TABLET BY MOUTH WEEKLY WITH A FULL GLASS OF WATER FIRST THING IN THE MORNING WITH NOTHING BY MOUTH DO NOT LAY DOWN FOR ONE HOUR AFTER 12 tablet 3 10/14/2024    Loratadine 10 MG capsule Take 1 capsule by mouth As Needed.   10/16/2024    Omega-3 Fatty Acids (fish oil) 1000 MG capsule capsule Take 1 capsule by mouth Daily With Breakfast.   10/16/2024    omeprazole (priLOSEC) 20 MG capsule TAKE 1 CAPSULE BY MOUTH DAILY 90 capsule 3 10/16/2024    Sodium Fluoride 1.1 % gel See Admin Instructions.   10/14/2024       Allergies:  Cephalexin, Hydrocortisone, Latex, and Neomycin-bacitracin zn-polymyx    Objective     Blood pressure 165/85, pulse 102, temperature 97.2 °F (36.2 °C), temperature source Temporal, resp. rate 16,  weight 54.4 kg (120 lb), SpO2 99%.  Physical Exam:   General: patient awake, alert and cooperative    Assessment & Plan     Diagnosis:  1. Esophagitis, eosinophilic (Primary)  -     Case Request; Standing  -     Implement Anesthesia orders day of procedure.; Standing  -     Verify bowel prep was successful; Standing  -     Give tap water enema if bowel prep was insufficient; Standing  -     Case Request     2. Personal history of colonic polyps  -     Case Request; Standing  -     Implement Anesthesia orders day of procedure.; Standing  -     Verify bowel prep was successful; Standing  -     Give tap water enema if bowel prep was insufficient; Standing  -     Case Request       Anticipated Surgical Procedure:  Colonoscopy    The risks, benefits, and alternatives of this procedure have been discussed with the patient or the responsible party- the patient understands and agrees to proceed.

## 2024-10-18 NOTE — ANESTHESIA POSTPROCEDURE EVALUATION
Patient: Joanne Martin    Procedure Summary       Date: 10/18/24 Room / Location: [facility]    Anesthesia Start: 0912 Anesthesia Stop: 0947    Procedures:       COLONOSCOPY to Cecum      ESOPHAGOGASTRODUODENOSCOPY with BX and BALLOON DILATION 15-18mm (Esophagus) Diagnosis:       Esophagitis, eosinophilic      Personal history of colonic polyps      (Esophagitis, eosinophilic [K20.0])      (Personal history of colonic polyps [Z86.010])    Surgeons: Jarrod Bowers MD Provider: Alvaro Whitfield MD    Anesthesia Type: MAC ASA Status: 3            Anesthesia Type: MAC    Vitals  Vitals Value Taken Time   /69 10/18/24 1015   Temp 36.3 °C (97.4 °F) 10/18/24 0947   Pulse 87 10/18/24 1009   Resp 16 10/18/24 0954   SpO2 92 % 10/18/24 1009   Vitals shown include unfiled device data.        Post Anesthesia Care and Evaluation    Patient location during evaluation: bedside  Patient participation: complete - patient participated  Level of consciousness: awake  Pain management: adequate    Airway patency: patent  Anesthetic complications: No anesthetic complications    Cardiovascular status: acceptable  Respiratory status: acceptable  Hydration status: acceptable    Comments: */72   Pulse 82   Temp 36.3 °C (97.4 °F) (Infrared)   Resp 16   Wt 54.4 kg (120 lb)   SpO2 98%   BMI 19.37 kg/m²

## 2024-10-21 LAB
CYTO UR: NORMAL
LAB AP CASE REPORT: NORMAL
PATH REPORT.FINAL DX SPEC: NORMAL
PATH REPORT.GROSS SPEC: NORMAL

## 2024-10-31 ENCOUNTER — TELEPHONE (OUTPATIENT)
Dept: GASTROENTEROLOGY | Facility: CLINIC | Age: 69
End: 2024-10-31
Payer: COMMERCIAL

## 2024-10-31 DIAGNOSIS — Z12.11 ENCOUNTER FOR SCREENING FOR MALIGNANT NEOPLASM OF COLON: ICD-10-CM

## 2024-10-31 NOTE — TELEPHONE ENCOUNTER
Provider: DR DENAE BUCKLEY     Caller: GEORGIANA YEN     Relationship to Patient: SELF    Phone Number: [phone]     Reason for Call: PT WOULD LIKE RESULTS FROM HER PATHOLOGY REPORT PLEASE ADVISE AND IF AT ALL POSSIBLE CALL HER TOMORROW 11/1/24 AT ANYTIME SHE IS UNAVAILABLE TODAY

## 2024-10-31 NOTE — TELEPHONE ENCOUNTER
Jarrod Bowers MD  10/23/2024  2:37 PM EDT     Pathology benign  Continue PPI  Office visit [facility] 4 weeks  Colonoscopy recall 5 years

## 2024-11-14 ENCOUNTER — TELEPHONE (OUTPATIENT)
Dept: GASTROENTEROLOGY | Facility: CLINIC | Age: 69
End: 2024-11-14
Payer: COMMERCIAL

## 2024-11-14 NOTE — TELEPHONE ENCOUNTER
Pt reviewed results via [employer].     Sent pt [facility] msg advising of results and recommendations. Advised to call if any questions.     Colonoscopy placed in  and recall for 10/18/29.

## 2024-11-14 NOTE — TELEPHONE ENCOUNTER
----- Message from Jarrod Bowers sent at 10/23/2024  2:37 PM EDT -----  Pathology benign  Continue PPI  Office visit [facility] 4 weeks  Colonoscopy recall 5 years

## 2024-12-17 ENCOUNTER — OFFICE VISIT (OUTPATIENT)
Dept: GASTROENTEROLOGY | Facility: CLINIC | Age: 69
End: 2024-12-17
Payer: COMMERCIAL

## 2024-12-17 VITALS
TEMPERATURE: 97 F | HEART RATE: 71 BPM | BODY MASS INDEX: 19.93 KG/M2 | DIASTOLIC BLOOD PRESSURE: 82 MMHG | WEIGHT: 124 LBS | SYSTOLIC BLOOD PRESSURE: 125 MMHG | HEIGHT: 66 IN

## 2024-12-17 DIAGNOSIS — K20.0 ESOPHAGITIS, EOSINOPHILIC: Primary | ICD-10-CM

## 2024-12-17 DIAGNOSIS — R13.19 ESOPHAGEAL DYSPHAGIA: ICD-10-CM

## 2024-12-17 NOTE — PROGRESS NOTES
"Chief Complaint  Difficulty Swallowing    Subjective          History Of Present Illness:    Joanne Martin is a  69 y.o. female patient of Dr. Bowers who presents as a follow-up for dysphagia and EOE.    Patient does have a personal history of colon polyps.    Patient currently doing well.  She is not experience any dysphagia and continues to chew and eat carefully.  She denies any nausea, vomiting, chest pain, abdominal pain, abnormal weight loss, poor appetite.  She denies any black or bloody stools.  She has been continuing to take omeprazole 20 mg daily.  She is inquiring about coming off this medication today.    Additional data reviewed:  EGD 10/18/2024 -low-grade of narrowing Schatzki's ring status post dilation, normal stomach, normal duodenum.  Benign pathology.  Colonoscopy 10/18/2024 -normal TI, nonbleeding internal hemorrhoids, otherwise normal.  Recall 5 years.  EGD 6/28/2022 with Dr. Romo with an irregular Z-line but no obvious endoscopic esophageal abnormality to explain her dysphagia.  She did have gastritis.  Normal-appearing duodenum.  Gastric biopsies with chronic gastritis, no H. pylori or intestinal metaplasia.  Esophageal biopsies consistent with intraepithelial eosinophils greater than 75/hpf on one biopsy. Biopsies with clinical symptoms suggestive of eosinophilic esophagitis.           Objective   Vital Signs:   /82   Pulse 71   Temp 97 °F (36.1 °C)   Ht 167.6 cm (66\")   Wt 56.2 kg (124 lb)   BMI 20.01 kg/m²       Physical Exam  Vitals reviewed.   Constitutional:       General: She is not in acute distress.     Appearance: Normal appearance. She is not ill-appearing.   HENT:      Head: Normocephalic and atraumatic.      Nose: Nose normal.      Mouth/Throat:      Pharynx: Oropharynx is clear.   Eyes:      Conjunctiva/sclera: Conjunctivae normal.   Pulmonary:      Effort: Pulmonary effort is normal.   Abdominal:      General: There is no distension.      Palpations: Abdomen is " soft. There is no mass.      Tenderness: There is no abdominal tenderness.   Musculoskeletal:         General: No swelling. Normal range of motion.      Cervical back: Normal range of motion.   Skin:     General: Skin is warm and dry.      Findings: No bruising or rash.   Neurological:      General: No focal deficit present.      Mental Status: She is alert and oriented to person, place, and time.      Motor: No weakness.      Gait: Gait normal.   Psychiatric:         Mood and Affect: Mood normal.          Result Review :   The following data was reviewed by: Bailey Vazquez PA-C on 12/17/2024:  CMP          2/23/2024    08:07   CMP   Glucose 89    BUN 13    Creatinine 0.81    Sodium 137    Potassium 4.4    Chloride 101    Calcium 9.3    Total Protein 6.3    Albumin 4.2    Globulin 2.1    Total Bilirubin 0.4    Alkaline Phosphatase 73    AST (SGOT) 18    ALT (SGPT) 18    BUN/Creatinine Ratio 16.0      CBC          2/23/2024    08:07   CBC   WBC 5.24    RBC 4.66    Hemoglobin 14.4    Hematocrit 42.3    MCV 90.8    MCH 30.9    MCHC 34.0    RDW 13.0    Platelets 244            Assessment and Plan    Diagnoses and all orders for this visit:    1. Esophagitis, eosinophilic (Primary)    2. Esophageal dysphagia       Discussed findings of her EGD and colonoscopy.  Okay to come off PPI therapy at this time. She has significant concerns about her osteoporosis.  Would consider restarting PPI if she experiences worsening dysphagia.    Follow Up   Return in about 1 year (around 12/17/2025) for Bailey Galvin PA-C.    Dragon dictation used throughout this note.            Bailey Galvin PA-C   [facility]  [address] [place] [address]  Office: [phone]

## 2025-02-25 DIAGNOSIS — E03.9 ACQUIRED HYPOTHYROIDISM: ICD-10-CM

## 2025-02-25 DIAGNOSIS — E78.5 HYPERLIPIDEMIA, UNSPECIFIED HYPERLIPIDEMIA TYPE: Primary | ICD-10-CM

## 2025-02-25 LAB
ALBUMIN SERPL-MCNC: 4 G/DL (ref 3.5–5.2)
ALBUMIN/GLOB SERPL: 1.6 G/DL
ALP SERPL-CCNC: 78 U/L (ref 39–117)
ALT SERPL-CCNC: 14 U/L (ref 1–33)
APPEARANCE UR: CLEAR
AST SERPL-CCNC: 18 U/L (ref 1–32)
BACTERIA #/AREA URNS HPF: NORMAL /HPF
BASOPHILS # BLD AUTO: 0.06 10*3/MM3 (ref 0–0.2)
BASOPHILS NFR BLD AUTO: 1 % (ref 0–1.5)
BILIRUB SERPL-MCNC: 0.4 MG/DL (ref 0–1.2)
BILIRUB UR QL STRIP: NEGATIVE
BUN SERPL-MCNC: 16 MG/DL (ref 8–23)
BUN/CREAT SERPL: 19 (ref 7–25)
CALCIUM SERPL-MCNC: 9.3 MG/DL (ref 8.6–10.5)
CASTS URNS MICRO: NORMAL
CHLORIDE SERPL-SCNC: 102 MMOL/L (ref 98–107)
CHOLEST SERPL-MCNC: 241 MG/DL (ref 0–200)
CO2 SERPL-SCNC: 24.8 MMOL/L (ref 22–29)
COLOR UR: YELLOW
CREAT SERPL-MCNC: 0.84 MG/DL (ref 0.57–1)
EGFRCR SERPLBLD CKD-EPI 2021: 75.8 ML/MIN/1.73
EOSINOPHIL # BLD AUTO: 0.4 10*3/MM3 (ref 0–0.4)
EOSINOPHIL NFR BLD AUTO: 6.8 % (ref 0.3–6.2)
EPI CELLS #/AREA URNS HPF: NORMAL /HPF
ERYTHROCYTE [DISTWIDTH] IN BLOOD BY AUTOMATED COUNT: 13.1 % (ref 12.3–15.4)
GLOBULIN SER CALC-MCNC: 2.5 GM/DL
GLUCOSE SERPL-MCNC: 99 MG/DL (ref 65–99)
GLUCOSE UR QL STRIP: NEGATIVE
HCT VFR BLD AUTO: 43.4 % (ref 34–46.6)
HDLC SERPL-MCNC: 90 MG/DL (ref 40–60)
HGB BLD-MCNC: 14 G/DL (ref 12–15.9)
HGB UR QL STRIP: ABNORMAL
IMM GRANULOCYTES # BLD AUTO: 0.01 10*3/MM3 (ref 0–0.05)
IMM GRANULOCYTES NFR BLD AUTO: 0.2 % (ref 0–0.5)
KETONES UR QL STRIP: NEGATIVE
LDLC SERPL CALC-MCNC: 140 MG/DL (ref 0–100)
LEUKOCYTE ESTERASE UR QL STRIP: ABNORMAL
LYMPHOCYTES # BLD AUTO: 1.87 10*3/MM3 (ref 0.7–3.1)
LYMPHOCYTES NFR BLD AUTO: 31.9 % (ref 19.6–45.3)
MCH RBC QN AUTO: 30 PG (ref 26.6–33)
MCHC RBC AUTO-ENTMCNC: 32.3 G/DL (ref 31.5–35.7)
MCV RBC AUTO: 92.9 FL (ref 79–97)
MONOCYTES # BLD AUTO: 0.48 10*3/MM3 (ref 0.1–0.9)
MONOCYTES NFR BLD AUTO: 8.2 % (ref 5–12)
NEUTROPHILS # BLD AUTO: 3.04 10*3/MM3 (ref 1.7–7)
NEUTROPHILS NFR BLD AUTO: 51.9 % (ref 42.7–76)
NITRITE UR QL STRIP: NEGATIVE
NRBC BLD AUTO-RTO: 0 /100 WBC (ref 0–0.2)
PH UR STRIP: 6.5 [PH] (ref 5–8)
PLATELET # BLD AUTO: 258 10*3/MM3 (ref 140–450)
POTASSIUM SERPL-SCNC: 4.2 MMOL/L (ref 3.5–5.2)
PROT SERPL-MCNC: 6.5 G/DL (ref 6–8.5)
PROT UR QL STRIP: NEGATIVE
RBC # BLD AUTO: 4.67 10*6/MM3 (ref 3.77–5.28)
RBC #/AREA URNS HPF: NORMAL /HPF
SODIUM SERPL-SCNC: 138 MMOL/L (ref 136–145)
SP GR UR STRIP: 1.01 (ref 1–1.03)
TRIGL SERPL-MCNC: 67 MG/DL (ref 0–150)
TSH SERPL DL<=0.005 MIU/L-ACNC: 0.8 UIU/ML (ref 0.27–4.2)
UROBILINOGEN UR STRIP-MCNC: ABNORMAL MG/DL
VLDLC SERPL CALC-MCNC: 11 MG/DL (ref 5–40)
WBC # BLD AUTO: 5.86 10*3/MM3 (ref 3.4–10.8)
WBC #/AREA URNS HPF: NORMAL /HPF

## 2025-03-04 ENCOUNTER — OFFICE VISIT (OUTPATIENT)
Dept: INTERNAL MEDICINE | Facility: CLINIC | Age: 70
End: 2025-03-04
Payer: MEDICARE

## 2025-03-04 VITALS
OXYGEN SATURATION: 98 % | WEIGHT: 122 LBS | DIASTOLIC BLOOD PRESSURE: 74 MMHG | BODY MASS INDEX: 19.61 KG/M2 | HEIGHT: 66 IN | SYSTOLIC BLOOD PRESSURE: 124 MMHG | HEART RATE: 84 BPM

## 2025-03-04 DIAGNOSIS — Z00.00 WELCOME TO MEDICARE PREVENTIVE VISIT: Primary | ICD-10-CM

## 2025-03-04 DIAGNOSIS — Z13.6 ENCOUNTER FOR SCREENING FOR VASCULAR DISEASE: ICD-10-CM

## 2025-03-04 DIAGNOSIS — I44.7 LBBB (LEFT BUNDLE BRANCH BLOCK): ICD-10-CM

## 2025-03-04 NOTE — PROGRESS NOTES
Subjective   The ABCs of the Annual Wellness Visit  Medicare Wellness Visit      Joanne Martin is a 70 y.o. patient who presents for a Medicare Wellness Visit.    The following portions of the patient's history were reviewed and   updated as appropriate: allergies, current medications, past family history, past medical history, past social history, past surgical history, and problem list.    Compared to one year ago, the patient's physical   health is the same.  Compared to one year ago, the patient's mental   health is the same.    Recent Hospitalizations:  She was not admitted to the hospital during the last year.     Current Medical Providers:  Patient Care Team:  Saniya Walker MD as PCP - General (Internal Medicine)    Outpatient Medications Prior to Visit   Medication Sig Dispense Refill    alendronate (FOSAMAX) 70 MG tablet TAKE 1 TABLET BY MOUTH WEEKLY WITH A FULL GLASS OF WATER FIRST THING IN THE MORNING WITH NOTHING BY MOUTH DO NOT LAY DOWN FOR ONE HOUR AFTER 12 tablet 3    levothyroxine (SYNTHROID, LEVOTHROID) 75 MCG tablet TAKE 1 TABLET BY MOUTH DAILY 90 tablet 3    Loratadine 10 MG capsule Take 1 capsule by mouth As Needed.      Omega-3 Fatty Acids (fish oil) 1000 MG capsule capsule Take 1 capsule by mouth Daily With Breakfast.      Sodium Fluoride 1.1 % gel See Admin Instructions.      COVID-19 mRNA Vac-Amirah,Pfizer, 30 MCG/0.3ML suspension prefilled syringe prefilled syringe Inject 0.3 mL into the appropriate muscle as directed by prescriber. 0.3 mL 0    influenza vac split high-dose (Fluzone High-Dose) 0.5 ML suspension prefilled syringe injection Inject 0.5 mL into the appropriate muscle as directed by prescriber. 0.5 mL 0    omeprazole (priLOSEC) 20 MG capsule TAKE 1 CAPSULE BY MOUTH DAILY (Patient not taking: Reported on 3/4/2025) 90 capsule 3     No facility-administered medications prior to visit.     No opioid medication identified on active medication list. I have reviewed chart for other  "potential  high risk medication/s and harmful drug interactions in the elderly.      Aspirin is not on active medication list.  Aspirin use is not indicated based on review of current medical condition/s. Risk of harm outweighs potential benefits.  .    Patient Active Problem List   Diagnosis    Hypothyroidism    Eczema    Other osteoporosis without current pathological fracture    Hyperlipidemia    History of tongue cancer    History of head and neck radiation    History of adenomatous polyp of colon    Oropharyngeal dysphagia    Cricopharyngeal achalasia    Esophagitis, eosinophilic    Personal history of colonic polyps     Advance Care Planning Advance Directive is not on file.  ACP discussion was held with the patient during this visit. Patient does not have an advance directive, information provided.            Objective   Vitals:    03/04/25 0811   BP: 124/74   Pulse: 84   SpO2: 98%   Weight: 55.3 kg (122 lb)   Height: 167.6 cm (65.98\")   PainSc: 0-No pain       Estimated body mass index is 19.7 kg/m² as calculated from the following:    Height as of this encounter: 167.6 cm (65.98\").    Weight as of this encounter: 55.3 kg (122 lb).    BMI is within normal parameters. No other follow-up for BMI required.        Gait and Balance Evaluation:  Normal         Does the patient have evidence of cognitive impairment? No  Lab Results   Component Value Date    CHLPL 241 (H) 02/25/2025    TRIG 67 02/25/2025    HDL 90 (H) 02/25/2025     (H) 02/25/2025    VLDL 11 02/25/2025       ECG 12 Lead    Date/Time: 3/4/2025 9:00 AM  Performed by: Saniya Walker MD    Authorized by: Saniya Walker MD  Previous ECG: no previous ECG available  Rhythm: sinus rhythm  Conduction: conduction normal  Conduction: left bundle branch block  ST Segments: ST segments normal  T Waves: T waves normal    Clinical impression: abnormal EKG                                                                                              "   Health  Risk Assessment    Smoking Status:  Social History     Tobacco Use   Smoking Status Never   Smokeless Tobacco Never     Alcohol Consumption:  Social History     Substance and Sexual Activity   Alcohol Use No       Fall Risk Screen  STEADI Fall Risk Assessment was completed, and patient is at LOW risk for falls.Assessment completed on:3/4/2025    Depression Screening   Little interest or pleasure in doing things? Not at all   Feeling down, depressed, or hopeless? Not at all   PHQ-2 Total Score 0      Health Habits and Functional and Cognitive Screening:      3/4/2025     8:11 AM   Functional & Cognitive Status   Do you have difficulty preparing food and eating? No   Do you have difficulty bathing yourself, getting dressed or grooming yourself? No   Do you have difficulty using the toilet? No   Do you have difficulty moving around from place to place? No   Do you have trouble with steps or getting out of a bed or a chair? No   Current Diet Well Balanced Diet   Dental Exam Up to date   Eye Exam Up to date   Exercise (times per week) 7 times per week   Current Exercises Include Walking   Do you need help using the phone?  No   Are you deaf or do you have serious difficulty hearing?  No   Do you need help to go to places out of walking distance? No   Do you need help shopping? No   Do you need help preparing meals?  No   Do you need help with housework?  No   Do you need help with laundry? No   Do you need help taking your medications? No   Do you need help managing money? No   Do you ever drive or ride in a car without wearing a seat belt? No   Have you felt unusual stress, anger or loneliness in the last month? No   Who do you live with? Alone   If you need help, do you have trouble finding someone available to you? No   Have you been bothered in the last four weeks by sexual problems? No   Do you have difficulty concentrating, remembering or making decisions? No           Age-appropriate Screening  "Schedule:  Refer to the list below for future screening recommendations based on patient's age, sex and/or medical conditions. Orders for these recommended tests are listed in the plan section. The patient has been provided with a written plan.    Health Maintenance List  Health Maintenance   Topic Date Due    ZOSTER VACCINE (2 of 3) 01/08/2016    DXA SCAN  07/07/2025    LIPID PANEL  02/25/2026    ANNUAL WELLNESS VISIT  03/04/2026    MAMMOGRAM  07/08/2026    COLORECTAL CANCER SCREENING  10/18/2029    TDAP/TD VACCINES (3 - Td or Tdap) 10/05/2031    HEPATITIS C SCREENING  Completed    COVID-19 Vaccine  Completed    INFLUENZA VACCINE  Completed    Pneumococcal Vaccine 50+  Completed                                                                                                                                                CMS Preventative Services Quick Reference  Risk Factors Identified During Encounter  Immunizations Discussed/Encouraged: Shingrix    The above risks/problems have been discussed with the patient.  Pertinent information has been shared with the patient in the After Visit Summary.  An After Visit Summary and PPPS were made available to the patient.    Follow Up:   Next Medicare Wellness visit to be scheduled in 1 year.         Additional E&M Note during same encounter follows:  Patient has additional, significant, and separately identifiable condition(s)/problem(s) that require work above and beyond the Medicare Wellness Visit     Chief Complaint  Annual Exam    Subjective   HPI  Joanne is also being seen today for additional medical problem/s.    Review of Systems   Respiratory: Negative.     Cardiovascular: Negative.       HLD.  Control is not optimal. Hypothyroidism.  TSH is in normal range.    OP.  She is on year three of Fosamax.  Discussed calcium and D.          Objective   Vital Signs:  /74   Pulse 84   Ht 167.6 cm (65.98\")   Wt 55.3 kg (122 lb)   SpO2 98%   BMI 19.70 kg/m²   Physical " Exam  Constitutional:       Appearance: She is well-developed.   HENT:      Head: Normocephalic and atraumatic.      Right Ear: Hearing, tympanic membrane and external ear normal.      Left Ear: Hearing, tympanic membrane and external ear normal.      Nose: Nose normal.   Neck:      Thyroid: No thyromegaly.   Cardiovascular:      Rate and Rhythm: Normal rate and regular rhythm.      Heart sounds: Normal heart sounds. No murmur heard.  Pulmonary:      Effort: Pulmonary effort is normal.      Breath sounds: Normal breath sounds.   Abdominal:      General: There is no distension.      Palpations: Abdomen is soft.      Tenderness: There is no abdominal tenderness.   Musculoskeletal:      Cervical back: Neck supple.   Lymphadenopathy:      Cervical: No cervical adenopathy.   Skin:     General: Skin is warm and dry.   Neurological:      Mental Status: She is alert and oriented to person, place, and time.   Psychiatric:         Speech: Speech normal.         Behavior: Behavior normal.         Thought Content: Thought content normal.         Judgment: Judgment normal.         The following data was reviewed by: Saniya Walker MD on 03/04/2025:    Common labs          2/25/2025    08:58   Common Labs   Glucose 99    BUN 16    Creatinine 0.84    Sodium 138    Potassium 4.2    Chloride 102    Calcium 9.3    Albumin 4.0    Total Bilirubin 0.4    Alkaline Phosphatase 78    AST (SGOT) 18    ALT (SGPT) 14    WBC 5.86    Hemoglobin 14.0    Hematocrit 43.4    Platelets 258    Total Cholesterol 241    Triglycerides 67    HDL Cholesterol 90    LDL Cholesterol  140              Assessment and Plan Additional age appropriate preventative wellness advice topics were discussed during today's preventative wellness exam(some topics already addressed during AWV portion of the note above):    Physical Activity: Advised cardiovascular activity 150 minutes per week as tolerated. (example brisk walk for 30 minutes, 5 days a week).            Welcome to Medicare preventive visit    Orders:    ECG 12 Lead    Ambulatory Referral to Cardiology    Encounter for screening for vascular disease    Orders:    CT Cardiac Calcium Score Without Dye; Future    Vascular Screening (Bundle) CAR; Future    LBBB (left bundle branch block)    Orders:    Ambulatory Referral to Cardiology     LBBB.  Family history in mom and sister per patient.  New but no previous EKG available.  She is not symptomatic.  Refer to cardiology for evaluation.        Follow Up   No follow-ups on file.  Patient was given instructions and counseling regarding her condition or for health maintenance advice. Please see specific information pulled into the AVS if appropriate.

## 2025-03-04 NOTE — PATIENT INSTRUCTIONS
Medicare Wellness  Personal Prevention Plan of Service     Date of Office Visit:    Encounter Provider:  Saniya Walker MD  Place of Service:  [facility]  Patient Name: Joanne Martin YOB: 1955    As part of the Medicare Wellness portion of your visit today, we are providing you with this personalized preventive plan of services (PPPS). This plan is based upon recommendations of the [place] Preventive Services Task Force (USPSTF) and the Advisory Committee on Immunization Practices (ACIP).    This lists the preventive care services that should be considered, and provides dates of when you are due. Items listed as completed are up-to-date and do not require any further intervention.    Health Maintenance   Topic Date Due    ZOSTER VACCINE (2 of 3) 2016    ANNUAL WELLNESS VISIT  Never done    DXA SCAN  2025    LIPID PANEL  2026    MAMMOGRAM  2026    COLORECTAL CANCER SCREENING  10/18/2029    TDAP/TD VACCINES (3 - Td or Tdap) 10/05/2031    HEPATITIS C SCREENING  Completed    COVID-19 Vaccine  Completed    INFLUENZA VACCINE  Completed    Pneumococcal Vaccine 50+  Completed       No orders of the defined types were placed in this encounter.      No follow-ups on file.

## 2025-03-14 ENCOUNTER — OFFICE VISIT (OUTPATIENT)
Dept: CARDIOLOGY | Facility: CLINIC | Age: 70
End: 2025-03-14
Payer: MEDICARE

## 2025-03-14 VITALS
WEIGHT: 123.6 LBS | HEART RATE: 97 BPM | SYSTOLIC BLOOD PRESSURE: 128 MMHG | OXYGEN SATURATION: 97 % | DIASTOLIC BLOOD PRESSURE: 82 MMHG | BODY MASS INDEX: 19.86 KG/M2 | HEIGHT: 66 IN

## 2025-03-14 DIAGNOSIS — K22.0 CRICOPHARYNGEAL ACHALASIA: Primary | ICD-10-CM

## 2025-03-14 DIAGNOSIS — Z85.810 HISTORY OF TONGUE CANCER: ICD-10-CM

## 2025-03-14 DIAGNOSIS — E03.9 ACQUIRED HYPOTHYROIDISM: ICD-10-CM

## 2025-03-14 DIAGNOSIS — R01.1 CARDIAC MURMUR, UNSPECIFIED: ICD-10-CM

## 2025-03-14 DIAGNOSIS — E78.5 HYPERLIPIDEMIA, UNSPECIFIED HYPERLIPIDEMIA TYPE: ICD-10-CM

## 2025-03-14 DIAGNOSIS — K20.0 ESOPHAGITIS, EOSINOPHILIC: ICD-10-CM

## 2025-03-14 DIAGNOSIS — Z92.3 HISTORY OF HEAD AND NECK RADIATION: ICD-10-CM

## 2025-03-14 RX ORDER — MULTIPLE VITAMINS W/ MINERALS TAB 9MG-400MCG
1 TAB ORAL DAILY
COMMUNITY

## 2025-03-14 NOTE — PROGRESS NOTES
CARDIOLOGY    Rayshawn Joshi MD    ENCOUNTER DATE:  03/14/25      Joanne Martin / 70 y.o. / female        CHIEF COMPLAINT / REASON FOR OFFICE VISIT     LBBB (Abnormal EKG)      HISTORY OF PRESENT ILLNESS       HPI    Joanne Martin is a 70 y.o. female with hyperlipidemia, history of tongue cancer s/p surgery and radiation, eosinophilic esophagitis who presents to establish care as a new patient.    Patient had EKG done earlier this month 3/2025 by PCP in office that showed NSR with LBBB.  Unclear if this was new.    Today, patient has no acute complaints. 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 Stays active with walking daily for a couple of miles and plans to engage in some cardio work-out. Denies chest pain, dyspnea on exertion, palpitation, leg edema, orthopnea, PND, dizziness, syncope, bleeding, or unexpected falls. Does not recall having had a heart attack or stroke. She also had dysphasia 2/2 esophageal stricture in the setting of eosinophilic esophagitis requiring esophageal dilatation by Dr. Bowers. Never smoker, denies alcohol or substance abuse.    REVIEW OF SYSTEMS     Review of Systems   Constitutional: Negative for weight loss.   Cardiovascular:  Negative for chest pain, dyspnea on exertion, leg swelling, orthopnea, palpitations, paroxysmal nocturnal dyspnea and syncope.   Respiratory:  Negative for shortness of breath.    Hematologic/Lymphatic: Negative for bleeding problem.   Musculoskeletal:  Negative for falls.   Gastrointestinal:  Negative for hematochezia and melena.   Genitourinary:  Negative for hematuria.   Neurological:  Negative for dizziness and light-headedness.   Psychiatric/Behavioral:  Negative for altered mental status.            MEDICATIONS      Outpatient Encounter Medications as of 3/14/2025   Medication Sig Dispense Refill    alendronate (FOSAMAX) 70 MG tablet TAKE 1 TABLET BY MOUTH WEEKLY WITH A FULL GLASS OF WATER FIRST THING IN THE MORNING WITH NOTHING BY  "MOUTH DO NOT LAY DOWN FOR ONE HOUR AFTER 12 tablet 3    Calcium Carb-Cholecalciferol (CALCIUM 500 + D PO) Take  by mouth.      levothyroxine (SYNTHROID, LEVOTHROID) 75 MCG tablet TAKE 1 TABLET BY MOUTH DAILY 90 tablet 3    Loratadine 10 MG capsule Take 1 capsule by mouth As Needed.      multivitamin with minerals tablet tablet Take 1 tablet by mouth Daily.      Omega-3 Fatty Acids (fish oil) 1000 MG capsule capsule Take 1 capsule by mouth Daily With Breakfast.      Sodium Fluoride 1.1 % gel See Admin Instructions.      Tetrahydrozoline-Zn Sulfate (ALLERGY RELIEF EYE DROPS OP) Apply  to eye(s) as directed by provider.       No facility-administered encounter medications on file as of 3/14/2025.         VITAL SIGNS     Visit Vitals  /82   Pulse 97   Ht 167.6 cm (66\")   Wt 56.1 kg (123 lb 9.6 oz)   SpO2 97%   BMI 19.95 kg/m²         Wt Readings from Last 3 Encounters:   03/14/25 56.1 kg (123 lb 9.6 oz)   03/04/25 55.3 kg (122 lb)   12/17/24 56.2 kg (124 lb)     Body mass index is 19.95 kg/m².      PHYSICAL EXAMINATION     Vitals and nursing note reviewed.   Constitutional:       Appearance: Healthy appearance. Not in distress.   Neck:      Vascular: No JVR.   Pulmonary:      Effort: Pulmonary effort is normal.      Breath sounds: Normal breath sounds. No wheezing. No rhonchi. No rales.   Cardiovascular:      Normal rate. Regular rhythm.      Murmurs: There is a grade 2/6 systolic murmur.   Edema:     Peripheral edema absent.   Abdominal:      General: There is no distension.      Palpations: Abdomen is soft.      Tenderness: There is no abdominal tenderness.   Musculoskeletal:      Cervical back: Neck supple. Skin:     General: Skin is cool.   Neurological:      Mental Status: Alert and oriented to person, place and time.           REVIEWED DATA     Procedures      Lab Results   Component Value Date    WBC 5.86 02/25/2025    HGB 14.0 02/25/2025    HCT 43.4 02/25/2025    MCV 92.9 02/25/2025     02/25/2025 "       Lab Results   Component Value Date    HGBA1C 5.50 08/27/2018       Lab Results   Component Value Date    GLUCOSE 99 02/25/2025    BUN 16 02/25/2025    CREATININE 0.84 02/25/2025    EGFR 75.8 02/25/2025    BCR 19.0 02/25/2025    K 4.2 02/25/2025    CO2 24.8 02/25/2025    CALCIUM 9.3 02/25/2025    ALBUMIN 4.0 02/25/2025    BILITOT 0.4 02/25/2025    AST 18 02/25/2025    ALT 14 02/25/2025       Lab Results   Component Value Date    CHLPL 241 (H) 02/25/2025    TRIG 67 02/25/2025    HDL 90 (H) 02/25/2025     (H) 02/25/2025             ASSESSMENT & PLAN     Diagnoses and all orders for this visit:    1. Cricopharyngeal achalasia (Primary)    2. Esophagitis, eosinophilic    3. Hyperlipidemia, unspecified hyperlipidemia type  Overview:  3.9% 10 year risk on 9/30/2020, 3.7% 10 year risk on 10/5/2021.      4. Acquired hypothyroidism    5. History of tongue cancer  Overview:  Initial in 1996.  Surgery.  Reoccurrence in 2008.  Radiation at that time.        6. History of head and neck radiation       Abnormal EKG/LBBB: EKG done earlier this month 3/2025 by PCP in office that showed NSR with LBBB.  Unclear if this was new.  Patient is otherwise reasonably active without angina.  We will arrange for outpatient echo to rule out structural abnormalities.  Hyperlipidemia/ASCVD risk: Lipids last month 2/2025 showed HDL 90, . 10-year ASCVD 7.2%, intermediate.  PCP already ordered a CT calcium score and a vascular screening bundle, will help follow-up on results to provide guidance, specifically on the indications for statin therapy.  History of tongue cancer: Diagnosed in 1996 requiring extensive surgery including partial glossectomy at [facility]. Recurred in 2008 requiring radiation with multiple negative surveillance, now released by ENT and getting regular exam by her dentist.   History of squamous cell carcinoma: Required resection on the lipid in 2021.  Eosinophilic esophagitis: Hx of stricture requiring  esophageal dilatation in 10/2024 by Dr. Bowers. Management per primary team and other specialists.    I spent 31 minutes caring for Joanne on this date of service. This time includes time spent by me in the following activities: preparing for the visit, reviewing tests, performing a medically appropriate examination and/or evaluation, counseling and educating the patient/family/caregiver, and referring and communicating with other health care professionals.     Additionally, I am providing longitudinal care which includes continuously being a focal point for all of the patient's health care services and ongoing medical care related to abnormal EKG and hyperlipidemia as documented above.    Rayshawn Joshi MD. PhD. FACC  03/14/25  09:11 EDT    Part of this note may be an electronic transcription/translation of spoken language to printed text using the Dragon dictation system.

## 2025-03-26 ENCOUNTER — HOSPITAL ENCOUNTER (OUTPATIENT)
Dept: CARDIOLOGY | Facility: HOSPITAL | Age: 70
Discharge: HOME | End: 2025-03-26
Admitting: INTERNAL MEDICINE
Payer: MEDICARE

## 2025-03-26 VITALS
HEART RATE: 93 BPM | HEIGHT: 66 IN | DIASTOLIC BLOOD PRESSURE: 90 MMHG | BODY MASS INDEX: 19.77 KG/M2 | SYSTOLIC BLOOD PRESSURE: 180 MMHG | WEIGHT: 123 LBS

## 2025-03-26 DIAGNOSIS — K22.0 CRICOPHARYNGEAL ACHALASIA: ICD-10-CM

## 2025-03-26 DIAGNOSIS — R01.1 CARDIAC MURMUR, UNSPECIFIED: ICD-10-CM

## 2025-03-26 DIAGNOSIS — K20.0 ESOPHAGITIS, EOSINOPHILIC: ICD-10-CM

## 2025-03-26 DIAGNOSIS — E78.5 HYPERLIPIDEMIA, UNSPECIFIED HYPERLIPIDEMIA TYPE: ICD-10-CM

## 2025-03-26 LAB
AORTIC ARCH: 1.9 CM
AORTIC DIMENSIONLESS INDEX: 0.69 (DI)
ASCENDING AORTA: 2.8 CM
AV MEAN PRESS GRAD SYS DOP V1V2: 4 MMHG
AV VMAX SYS DOP: 133 CM/SEC
BH CV ECHO MEAS - ACS: 1.6 CM
BH CV ECHO MEAS - AO MAX PG: 7.1 MMHG
BH CV ECHO MEAS - AO ROOT DIAM: 2.9 CM
BH CV ECHO MEAS - AO V2 VTI: 25.1 CM
BH CV ECHO MEAS - AVA(I,D): 1.67 CM2
BH CV ECHO MEAS - EDV(CUBED): 50.7 ML
BH CV ECHO MEAS - EDV(MOD-SP2): 51 ML
BH CV ECHO MEAS - EDV(MOD-SP4): 52 ML
BH CV ECHO MEAS - EF(MOD-SP2): 62.7 %
BH CV ECHO MEAS - EF(MOD-SP4): 59.6 %
BH CV ECHO MEAS - ESV(CUBED): 14.3 ML
BH CV ECHO MEAS - ESV(MOD-SP2): 19 ML
BH CV ECHO MEAS - ESV(MOD-SP4): 21 ML
BH CV ECHO MEAS - FS: 34.3 %
BH CV ECHO MEAS - IVS/LVPW: 1.13 CM
BH CV ECHO MEAS - IVSD: 0.9 CM
BH CV ECHO MEAS - LAT PEAK E' VEL: 13.8 CM/SEC
BH CV ECHO MEAS - LV DIASTOLIC VOL/BSA (35-75): 32 CM2
BH CV ECHO MEAS - LV MASS(C)D: 89.5 GRAMS
BH CV ECHO MEAS - LV MAX PG: 4.4 MMHG
BH CV ECHO MEAS - LV MEAN PG: 2 MMHG
BH CV ECHO MEAS - LV SYSTOLIC VOL/BSA (12-30): 12.9 CM2
BH CV ECHO MEAS - LV V1 MAX: 105 CM/SEC
BH CV ECHO MEAS - LV V1 VTI: 17.4 CM
BH CV ECHO MEAS - LVIDD: 3.7 CM
BH CV ECHO MEAS - LVIDS: 2.43 CM
BH CV ECHO MEAS - LVOT AREA: 2.41 CM2
BH CV ECHO MEAS - LVOT DIAM: 1.75 CM
BH CV ECHO MEAS - LVPWD: 0.8 CM
BH CV ECHO MEAS - MED PEAK E' VEL: 9.2 CM/SEC
BH CV ECHO MEAS - MR MAX PG: 21 MMHG
BH CV ECHO MEAS - MR MAX VEL: 229.1 CM/SEC
BH CV ECHO MEAS - MV A DUR: 0.09 SEC
BH CV ECHO MEAS - MV A MAX VEL: 78.1 CM/SEC
BH CV ECHO MEAS - MV DEC SLOPE: 376.8 CM/SEC2
BH CV ECHO MEAS - MV DEC TIME: 0.18 SEC
BH CV ECHO MEAS - MV E MAX VEL: 61.6 CM/SEC
BH CV ECHO MEAS - MV E/A: 0.79
BH CV ECHO MEAS - MV MAX PG: 4.1 MMHG
BH CV ECHO MEAS - MV MEAN PG: 1.81 MMHG
BH CV ECHO MEAS - MV P1/2T: 61 MSEC
BH CV ECHO MEAS - MV V2 VTI: 16.4 CM
BH CV ECHO MEAS - MVA(P1/2T): 3.6 CM2
BH CV ECHO MEAS - MVA(VTI): 2.6 CM2
BH CV ECHO MEAS - PA ACC TIME: 0.15 SEC
BH CV ECHO MEAS - PA V2 MAX: 153.1 CM/SEC
BH CV ECHO MEAS - PULM A REVS DUR: 0.11 SEC
BH CV ECHO MEAS - PULM A REVS VEL: 48.5 CM/SEC
BH CV ECHO MEAS - PULM DIAS VEL: 45.6 CM/SEC
BH CV ECHO MEAS - PULM S/D: 1.61
BH CV ECHO MEAS - PULM SYS VEL: 73.2 CM/SEC
BH CV ECHO MEAS - QP/QS: 1.46
BH CV ECHO MEAS - RV MAX PG: 7.9 MMHG
BH CV ECHO MEAS - RV V1 MAX: 140.7 CM/SEC
BH CV ECHO MEAS - RV V1 VTI: 26.7 CM
BH CV ECHO MEAS - RVOT DIAM: 1.71 CM
BH CV ECHO MEAS - SUP REN AO DIAM: 1.9 CM
BH CV ECHO MEAS - SV(LVOT): 42 ML
BH CV ECHO MEAS - SV(MOD-SP2): 32 ML
BH CV ECHO MEAS - SV(MOD-SP4): 31 ML
BH CV ECHO MEAS - SV(RVOT): 61.3 ML
BH CV ECHO MEAS - SVI(LVOT): 25.8 ML/M2
BH CV ECHO MEAS - SVI(MOD-SP2): 19.7 ML/M2
BH CV ECHO MEAS - SVI(MOD-SP4): 19.1 ML/M2
BH CV ECHO MEAS - TAPSE (>1.6): 2.5 CM
BH CV ECHO MEAS - TR MAX PG: 20.6 MMHG
BH CV ECHO MEAS - TR MAX VEL: 226.7 CM/SEC
BH CV ECHO MEASUREMENTS AVERAGE E/E' RATIO: 5.36
BH CV XLRA - RV BASE: 2.6 CM
BH CV XLRA - RV LENGTH: 6.2 CM
BH CV XLRA - RV MID: 2.05 CM
BH CV XLRA - TDI S': 18.8 CM/SEC
LEFT ATRIUM VOLUME INDEX: 18.3 ML/M2
LV EF BIPLANE MOD: 63.9 %
SINUS: 2.6 CM
STJ: 2.24 CM

## 2025-03-26 PROCEDURE — 93306 TTE W/DOPPLER COMPLETE: CPT

## 2025-06-04 ENCOUNTER — HOSPITAL ENCOUNTER (OUTPATIENT)
Dept: CT IMAGING | Facility: HOSPITAL | Age: 70
Discharge: HOME | End: 2025-06-04

## 2025-06-04 ENCOUNTER — HOSPITAL ENCOUNTER (OUTPATIENT)
Dept: CARDIOLOGY | Facility: HOSPITAL | Age: 70
Discharge: HOME | End: 2025-06-04

## 2025-06-04 DIAGNOSIS — Z13.6 ENCOUNTER FOR SCREENING FOR VASCULAR DISEASE: ICD-10-CM

## 2025-06-04 LAB
BH CV VAS SCREENING CAROTID CCA LEFT: 81 CM/SEC
BH CV VAS SCREENING CAROTID CCA RIGHT: 67 CM/SEC
BH CV VAS SCREENING CAROTID ICA LEFT: 129 CM/SEC
BH CV VAS SCREENING CAROTID ICA RIGHT: 116 CM/SEC
BH CV XLRA MEAS - MID AO DIAM: 2 CM
BH CV XLRA MEAS - PAD LEFT ABI PT: 1.22
BH CV XLRA MEAS - PAD LEFT ARM: 160 MMHG
BH CV XLRA MEAS - PAD LEFT LEG PT: 195 MMHG
BH CV XLRA MEAS - PAD RIGHT ABI PT: 1.25
BH CV XLRA MEAS - PAD RIGHT ARM: 154 MMHG
BH CV XLRA MEAS - PAD RIGHT LEG PT: 200 MMHG
BH CV XLRA MEAS LEFT DIST CCA EDV: 19.8 CM/SEC
BH CV XLRA MEAS LEFT DIST CCA PSV: 81.2 CM/SEC
BH CV XLRA MEAS LEFT ICA/CCA RATIO: 1.6
BH CV XLRA MEAS LEFT PROX ICA EDV: -38.4 CM/SEC
BH CV XLRA MEAS LEFT PROX ICA PSV: -129 CM/SEC
BH CV XLRA MEAS RIGHT DIST CCA EDV: 13.7 CM/SEC
BH CV XLRA MEAS RIGHT DIST CCA PSV: 66.5 CM/SEC
BH CV XLRA MEAS RIGHT ICA/CCA RATIO: 1.7
BH CV XLRA MEAS RIGHT PROX ICA EDV: -34.3 CM/SEC
BH CV XLRA MEAS RIGHT PROX ICA PSV: -116 CM/SEC

## 2025-06-04 PROCEDURE — VASCULARSCN2 VASCULAR SCREENING (BUNDLE) CAR: Performed by: INTERNAL MEDICINE

## 2025-06-04 PROCEDURE — 93799 UNLISTED CV SVC/PROCEDURE: CPT

## 2025-06-04 PROCEDURE — 75571 CT HRT W/O DYE W/CA TEST: CPT

## 2025-06-06 ENCOUNTER — TELEPHONE (OUTPATIENT)
Dept: INTERNAL MEDICINE | Facility: CLINIC | Age: 70
End: 2025-06-06

## 2025-06-06 PROBLEM — I65.22 STENOSIS OF LEFT CAROTID ARTERY: Status: ACTIVE | Noted: 2025-06-06

## 2025-06-06 RX ORDER — ROSUVASTATIN CALCIUM 10 MG/1
10 TABLET, COATED ORAL
Qty: 90 TABLET | Refills: 3 | Status: SHIPPED | OUTPATIENT
Start: 2025-06-06

## 2025-06-06 NOTE — TELEPHONE ENCOUNTER
Caller: Joanne Martin    Relationship: Self    Best call back number: [phone]     Caller requesting test results: VASCULAR SCREENING AND     ONE WAS A CT SCAN THE OTHER ULTRA SOUND    What test was performed: VASCULAR SCREENING    When was the test performed: 6/4/2025    Where was the test performed: [facility]    Additional notes:     PATIENT WOULD LIKE TO DISCUSS THE TEST RESULTS WITH YOU.  THE RESULTS POSTED TO THE PATIENTS MYCHART ON 6/5/2025    PLEASE ADVISE

## 2025-07-10 ENCOUNTER — APPOINTMENT (OUTPATIENT)
Dept: WOMENS IMAGING | Facility: HOSPITAL | Age: 70
End: 2025-07-10
Payer: MEDICARE

## 2025-07-10 PROCEDURE — 77063 BREAST TOMOSYNTHESIS BI: CPT | Performed by: RADIOLOGY

## 2025-07-10 PROCEDURE — 77067 SCR MAMMO BI INCL CAD: CPT | Performed by: RADIOLOGY

## 2025-07-28 RX ORDER — ALENDRONATE SODIUM 70 MG/1
TABLET ORAL
Qty: 12 TABLET | Refills: 3 | Status: SHIPPED | OUTPATIENT
Start: 2025-07-28

## 2025-08-13 RX ORDER — LEVOTHYROXINE SODIUM 75 UG/1
75 TABLET ORAL DAILY
Qty: 90 TABLET | Refills: 3 | Status: SHIPPED | OUTPATIENT
Start: 2025-08-13

## (undated) DEVICE — ADAPT CLN BIOGUARD AIR/H2O DISP

## (undated) DEVICE — CANN O2 ETCO2 FITS ALL CONN CO2 SMPL A/ 7IN DISP LF

## (undated) DEVICE — GOWN ISOL W/THUMB UNIV BLU BX/15

## (undated) DEVICE — THE CARR-LOCKE INJECTION NEEDLE IS A SINGLE USE, DISPOSABLE, FLEXIBLE SHEATH INJECTION NEEDLE USED FOR THE INJECTION OF VARIOUS TYPES OF MEDIA THROUGH FLEXIBLE ENDOSCOPES.

## (undated) DEVICE — VIAL FORMLN CAP 10PCT 20ML

## (undated) DEVICE — KT ORCA ORCAPOD DISP STRL

## (undated) DEVICE — SENSR O2 OXIMAX FNGR A/ 18IN NONSTR

## (undated) DEVICE — LN SMPL CO2 SHTRM SD STREAM W/M LUER

## (undated) DEVICE — FLEX ADVANTAGE 1500CC: Brand: FLEX ADVANTAGE

## (undated) DEVICE — Device

## (undated) DEVICE — TUBING, SUCTION, 1/4" X 10', STRAIGHT: Brand: MEDLINE

## (undated) DEVICE — FRCP BX RADJAW4 NDL 2.8 240CM LG OG BX40

## (undated) DEVICE — MSK ENDO PORT O2 POM ELITE CURAPLEX A/

## (undated) DEVICE — SINGLE-USE BIOPSY FORCEPS: Brand: RADIAL JAW 4

## (undated) DEVICE — SNAR POLYP SENSATION STDOVL 27 240 BX40

## (undated) DEVICE — BLCK/BITE BLOX W/DENTL/RIM W/STRAP 54F

## (undated) DEVICE — GOWN ,SIRUS,NONREINFORCED 3XL: Brand: MEDLINE

## (undated) DEVICE — THE SINGLE USE ETRAP – POLYP TRAP IS USED FOR SUCTION RETRIEVAL OF ENDOSCOPICALLY REMOVED POLYPS.: Brand: ETRAP

## (undated) DEVICE — BITEBLOCK OMNI BLOC

## (undated) DEVICE — ADAPT CLN SCPE ENDO PORPOISE BX/50 DISP

## (undated) DEVICE — DEV INFL ALLIANCE2 SYS

## (undated) DEVICE — SNAR POLYP CAPTIVATOR CRSNT 27MM 240CM

## (undated) DEVICE — ESOPHAGEAL BALLOON DILATATION CATHETER: Brand: CRE FIXED WIRE